# Patient Record
Sex: FEMALE | Race: WHITE
[De-identification: names, ages, dates, MRNs, and addresses within clinical notes are randomized per-mention and may not be internally consistent; named-entity substitution may affect disease eponyms.]

---

## 2017-09-20 ENCOUNTER — HOSPITAL ENCOUNTER (OUTPATIENT)
Dept: HOSPITAL 47 - RADMAMWWP | Age: 77
Discharge: HOME | End: 2017-09-20
Payer: MEDICARE

## 2017-09-20 DIAGNOSIS — Z12.31: Primary | ICD-10-CM

## 2017-09-20 PROCEDURE — 77063 BREAST TOMOSYNTHESIS BI: CPT

## 2017-09-21 NOTE — MM
Reason for exam: screening  (asymptomatic).

Last mammogram was performed 1 year and 4 months ago.



History:

Patient is postmenopausal.



Physical Findings:

A clinical breast exam by your physician is recommended on an annual basis and 

results should be correlated with mammographic findings.



MG 3D Screening Mammo W/Cad

Bilateral CC and MLO view(s) were taken.

Prior study comparison: May 18, 2016, bilateral MG screening mammo w CAD.  March 18, 2015, bilateral MG screening mammo w CAD.

There are scattered fibroglandular densities.  No significant changes when 

compared with prior studies.





ASSESSMENT: Negative, BI-RAD 1



RECOMMENDATION:

Routine screening mammogram of both breasts in 1 year.

## 2017-09-27 ENCOUNTER — HOSPITAL ENCOUNTER (OUTPATIENT)
Dept: HOSPITAL 47 - ORWHC2ENDO | Age: 77
Discharge: HOME | End: 2017-09-27
Payer: MEDICARE

## 2017-09-27 VITALS — DIASTOLIC BLOOD PRESSURE: 63 MMHG | SYSTOLIC BLOOD PRESSURE: 111 MMHG | HEART RATE: 83 BPM

## 2017-09-27 VITALS — BODY MASS INDEX: 29.9 KG/M2

## 2017-09-27 VITALS — RESPIRATION RATE: 18 BRPM

## 2017-09-27 VITALS — TEMPERATURE: 98.3 F

## 2017-09-27 DIAGNOSIS — K21.9: ICD-10-CM

## 2017-09-27 DIAGNOSIS — D12.5: ICD-10-CM

## 2017-09-27 DIAGNOSIS — E78.5: ICD-10-CM

## 2017-09-27 DIAGNOSIS — Z12.11: Primary | ICD-10-CM

## 2017-09-27 DIAGNOSIS — Z86.010: ICD-10-CM

## 2017-09-27 DIAGNOSIS — E11.9: ICD-10-CM

## 2017-09-27 DIAGNOSIS — K57.30: ICD-10-CM

## 2017-09-27 DIAGNOSIS — Z88.5: ICD-10-CM

## 2017-09-27 DIAGNOSIS — I10: ICD-10-CM

## 2017-09-27 DIAGNOSIS — Z85.828: ICD-10-CM

## 2017-09-27 DIAGNOSIS — Z79.899: ICD-10-CM

## 2017-09-27 DIAGNOSIS — D12.4: ICD-10-CM

## 2017-09-27 DIAGNOSIS — Z79.84: ICD-10-CM

## 2017-09-27 DIAGNOSIS — Z79.82: ICD-10-CM

## 2017-09-27 LAB — GLUCOSE BLD-MCNC: 96 MG/DL (ref 75–99)

## 2017-09-27 PROCEDURE — 88305 TISSUE EXAM BY PATHOLOGIST: CPT

## 2017-09-27 PROCEDURE — 45385 COLONOSCOPY W/LESION REMOVAL: CPT

## 2018-10-31 ENCOUNTER — HOSPITAL ENCOUNTER (OUTPATIENT)
Dept: HOSPITAL 47 - RADMAMWWP | Age: 78
Discharge: HOME | End: 2018-10-31
Payer: MEDICARE

## 2018-10-31 DIAGNOSIS — Z12.31: Primary | ICD-10-CM

## 2018-10-31 PROCEDURE — 77063 BREAST TOMOSYNTHESIS BI: CPT

## 2018-10-31 PROCEDURE — 77067 SCR MAMMO BI INCL CAD: CPT

## 2018-11-01 NOTE — MM
Reason for exam: screening  (asymptomatic).

Last mammogram was performed 1 year and 1 month ago.



History:

Patient is postmenopausal.



MG 3D Screening Mammo W/Cad

Bilateral CC and MLO view(s) were taken.

Prior study comparison: September 20, 2017, bilateral MG 3d screening mammo w/cad.

May 18, 2016, bilateral MG screening mammo w CAD.

There are scattered fibroglandular densities.  Chronic nodularity bilateral.  No 

significant changes when compared with prior studies.





ASSESSMENT: Benign, BI-RAD 2



RECOMMENDATION:

Routine screening mammogram of both breasts in 1 year.

## 2019-04-26 ENCOUNTER — HOSPITAL ENCOUNTER (OUTPATIENT)
Dept: HOSPITAL 47 - RADBDWWP | Age: 79
Discharge: HOME | End: 2019-04-26
Payer: MEDICARE

## 2019-04-26 DIAGNOSIS — M81.0: Primary | ICD-10-CM

## 2019-04-26 PROCEDURE — 77080 DXA BONE DENSITY AXIAL: CPT

## 2019-04-29 NOTE — BD
EXAMINATION TYPE: Axial Bone Density

 

DATE OF EXAM: 4/26/2019

 

COMPARISON: 1/25/2012

 

CLINICAL HISTORY: 79-year-old female disorder of bone

 

Height:  63.5

Weight:  177.3

 

FRAX RISK QUESTIONS:

Alcohol (3 or more units per day):  no

Family History (Parent hip fracture):  no

Glucocorticoids (More than 3mos):  no

           (Ex: prednisone, prednisolone, methylprednisolone, dexamethasone, and hydrocortisone).    
     

History of Fracture in Adulthood: yes

Secondary Osteoporosis:

  1.  Type 1 Diabetes: no

  2.  Hyperthyroidism: no

  3.  Menopause before 45: yes

  4.  Malnutrition: no

  5.  Chronic liver disease: no

Rheumatoid Arthritis: no

Current Tobacco Use: no

 

RISK FACTORS 

HISTORY OF: 

Family History of Osteoporosis: yes

Active: yes

Diet low in dairy products/other sources of calcium:  no

Postmenopausal woman: age 45

Lost more than 2 inches in height since high school: no

 

MEDICATIONS: metformin, simvastatin, lantis, acid reflux med

 

 

Additional History:

 

 

EXAM MEASUREMENTS: 

Bone mineral densitometry was performed using the [employer] System.

Bone mineral density as measured about the Lumbar spine is:  

----- L1-L4(G/cm2): 1.188

T Score Values are as follows:

----- L2: -0.6

----- L3: 0.6

----- L4: 0.7

----- L1-L4: 0.1

Bone mineral density has: increased 4.6 % since study of: 1.25.2012

 

Bone mineral density about the R hip (g/cm2): 0.676

Bone mineral density about the L hip (g/cm2): 0.646

T Score values are as follows:

-----R Neck: -2.6

-----L Neck: -2.8

-----R Total: -2.9

-----L Total: -2.9

Bone mineral density has: decreased -15.9 % since study of: 1.25.2012

 

 

IMPRESSION:

Osteoporosis (T Score less than -2.5).

 

There is increased fracture risk and therapy is usually indicated based on age.

 

Re-Screen 1-2 years.

 

 

 

 

 

NOTE:  T-SCORE=SD OF THE YOUNG ADULT MEAN.

## 2019-07-24 ENCOUNTER — HOSPITAL ENCOUNTER (OUTPATIENT)
Dept: HOSPITAL 47 - PROCWHC3 | Age: 79
Discharge: HOME | End: 2019-07-24
Payer: MEDICARE

## 2019-07-24 VITALS
SYSTOLIC BLOOD PRESSURE: 124 MMHG | DIASTOLIC BLOOD PRESSURE: 72 MMHG | RESPIRATION RATE: 16 BRPM | TEMPERATURE: 97.6 F | HEART RATE: 109 BPM

## 2019-07-24 DIAGNOSIS — M81.0: Primary | ICD-10-CM

## 2019-07-24 PROCEDURE — 96365 THER/PROPH/DIAG IV INF INIT: CPT

## 2019-12-20 ENCOUNTER — HOSPITAL ENCOUNTER (OUTPATIENT)
Dept: HOSPITAL 47 - RADMAMWWP | Age: 79
Discharge: HOME | End: 2019-12-20
Attending: FAMILY MEDICINE
Payer: MEDICARE

## 2019-12-20 DIAGNOSIS — Z12.31: Primary | ICD-10-CM

## 2019-12-20 PROCEDURE — 77063 BREAST TOMOSYNTHESIS BI: CPT

## 2019-12-20 PROCEDURE — 77067 SCR MAMMO BI INCL CAD: CPT

## 2019-12-23 NOTE — MM
Reason for exam: screening  (asymptomatic).

Last mammogram was performed 1 year and 2 months ago.



History:

Patient is postmenopausal and history of other cancer.



Physical Findings:

A clinical breast exam by your physician is recommended on an annual basis and 

results should be correlated with mammographic findings.



MG 3D Screening Mammo W/Cad

Bilateral CC and MLO view(s) were taken.

Prior study comparison: October 31, 2018, bilateral MG 3d screening mammo w/cad.  

September 20, 2017, bilateral MG 3d screening mammo w/cad.

There are scattered fibroglandular densities.  Stable benign calcifications. There

is no discrete abnormality.  No significant changes when compared with prior 

studies.





ASSESSMENT: Benign, BI-RAD 2



RECOMMENDATION:

Routine screening mammogram of both breasts in 1 year.

## 2021-03-05 ENCOUNTER — HOSPITAL ENCOUNTER (INPATIENT)
Dept: HOSPITAL 47 - EC | Age: 81
LOS: 8 days | DRG: 207 | End: 2021-03-13
Attending: INTERNAL MEDICINE | Admitting: INTERNAL MEDICINE
Payer: MEDICARE

## 2021-03-05 VITALS — BODY MASS INDEX: 37.8 KG/M2

## 2021-03-05 DIAGNOSIS — I50.32: ICD-10-CM

## 2021-03-05 DIAGNOSIS — E86.1: ICD-10-CM

## 2021-03-05 DIAGNOSIS — Z90.710: ICD-10-CM

## 2021-03-05 DIAGNOSIS — I48.0: ICD-10-CM

## 2021-03-05 DIAGNOSIS — E11.9: ICD-10-CM

## 2021-03-05 DIAGNOSIS — U07.1: Primary | ICD-10-CM

## 2021-03-05 DIAGNOSIS — G93.41: ICD-10-CM

## 2021-03-05 DIAGNOSIS — R06.82: ICD-10-CM

## 2021-03-05 DIAGNOSIS — K21.9: ICD-10-CM

## 2021-03-05 DIAGNOSIS — I95.9: ICD-10-CM

## 2021-03-05 DIAGNOSIS — D69.59: ICD-10-CM

## 2021-03-05 DIAGNOSIS — M81.0: ICD-10-CM

## 2021-03-05 DIAGNOSIS — J12.82: ICD-10-CM

## 2021-03-05 DIAGNOSIS — J80: ICD-10-CM

## 2021-03-05 DIAGNOSIS — I31.3: ICD-10-CM

## 2021-03-05 DIAGNOSIS — F41.9: ICD-10-CM

## 2021-03-05 DIAGNOSIS — I45.10: ICD-10-CM

## 2021-03-05 DIAGNOSIS — E87.2: ICD-10-CM

## 2021-03-05 DIAGNOSIS — Z79.82: ICD-10-CM

## 2021-03-05 DIAGNOSIS — I11.0: ICD-10-CM

## 2021-03-05 DIAGNOSIS — D72.810: ICD-10-CM

## 2021-03-05 DIAGNOSIS — Z79.4: ICD-10-CM

## 2021-03-05 DIAGNOSIS — E78.5: ICD-10-CM

## 2021-03-05 DIAGNOSIS — Z87.01: ICD-10-CM

## 2021-03-05 DIAGNOSIS — Z51.5: ICD-10-CM

## 2021-03-05 DIAGNOSIS — Z66: ICD-10-CM

## 2021-03-05 DIAGNOSIS — Z88.5: ICD-10-CM

## 2021-03-05 PROCEDURE — 86901 BLOOD TYPING SEROLOGIC RH(D): CPT

## 2021-03-05 PROCEDURE — 84443 ASSAY THYROID STIM HORMONE: CPT

## 2021-03-05 PROCEDURE — 83605 ASSAY OF LACTIC ACID: CPT

## 2021-03-05 PROCEDURE — 83690 ASSAY OF LIPASE: CPT

## 2021-03-05 PROCEDURE — 84132 ASSAY OF SERUM POTASSIUM: CPT

## 2021-03-05 PROCEDURE — 87205 SMEAR GRAM STAIN: CPT

## 2021-03-05 PROCEDURE — 36600 WITHDRAWAL OF ARTERIAL BLOOD: CPT

## 2021-03-05 PROCEDURE — 94640 AIRWAY INHALATION TREATMENT: CPT

## 2021-03-05 PROCEDURE — 83615 LACTATE (LD) (LDH) ENZYME: CPT

## 2021-03-05 PROCEDURE — 85025 COMPLETE CBC W/AUTO DIFF WBC: CPT

## 2021-03-05 PROCEDURE — 84100 ASSAY OF PHOSPHORUS: CPT

## 2021-03-05 PROCEDURE — 80053 COMPREHEN METABOLIC PANEL: CPT

## 2021-03-05 PROCEDURE — 71045 X-RAY EXAM CHEST 1 VIEW: CPT

## 2021-03-05 PROCEDURE — 87070 CULTURE OTHR SPECIMN AEROBIC: CPT

## 2021-03-05 PROCEDURE — 86850 RBC ANTIBODY SCREEN: CPT

## 2021-03-05 PROCEDURE — 36415 COLL VENOUS BLD VENIPUNCTURE: CPT

## 2021-03-05 PROCEDURE — 85379 FIBRIN DEGRADATION QUANT: CPT

## 2021-03-05 PROCEDURE — 87635 SARS-COV-2 COVID-19 AMP PRB: CPT

## 2021-03-05 PROCEDURE — 84145 PROCALCITONIN (PCT): CPT

## 2021-03-05 PROCEDURE — 83880 ASSAY OF NATRIURETIC PEPTIDE: CPT

## 2021-03-05 PROCEDURE — 93306 TTE W/DOPPLER COMPLETE: CPT

## 2021-03-05 PROCEDURE — 86900 BLOOD TYPING SEROLOGIC ABO: CPT

## 2021-03-05 PROCEDURE — 83036 HEMOGLOBIN GLYCOSYLATED A1C: CPT

## 2021-03-05 PROCEDURE — 83735 ASSAY OF MAGNESIUM: CPT

## 2021-03-05 PROCEDURE — 93005 ELECTROCARDIOGRAM TRACING: CPT

## 2021-03-05 PROCEDURE — 84484 ASSAY OF TROPONIN QUANT: CPT

## 2021-03-05 PROCEDURE — 94002 VENT MGMT INPAT INIT DAY: CPT

## 2021-03-05 PROCEDURE — 82805 BLOOD GASES W/O2 SATURATION: CPT

## 2021-03-05 PROCEDURE — 80048 BASIC METABOLIC PNL TOTAL CA: CPT

## 2021-03-05 PROCEDURE — 82550 ASSAY OF CK (CPK): CPT

## 2021-03-05 PROCEDURE — 85730 THROMBOPLASTIN TIME PARTIAL: CPT

## 2021-03-05 PROCEDURE — 85027 COMPLETE CBC AUTOMATED: CPT

## 2021-03-05 PROCEDURE — 99291 CRITICAL CARE FIRST HOUR: CPT

## 2021-03-05 PROCEDURE — 87040 BLOOD CULTURE FOR BACTERIA: CPT

## 2021-03-05 PROCEDURE — 94003 VENT MGMT INPAT SUBQ DAY: CPT

## 2021-03-05 PROCEDURE — 82728 ASSAY OF FERRITIN: CPT

## 2021-03-05 PROCEDURE — 85610 PROTHROMBIN TIME: CPT

## 2021-03-05 PROCEDURE — 86140 C-REACTIVE PROTEIN: CPT

## 2021-03-05 PROCEDURE — 81001 URINALYSIS AUTO W/SCOPE: CPT

## 2021-03-05 PROCEDURE — 96360 HYDRATION IV INFUSION INIT: CPT

## 2021-03-05 PROCEDURE — 96361 HYDRATE IV INFUSION ADD-ON: CPT

## 2021-03-05 NOTE — XR
EXAMINATION TYPE: XR chest 1V portable

 

DATE OF EXAM: 3/5/2021

 

COMPARISON: NONE

 

HISTORY: Short of breath

 

TECHNIQUE: Single view

 

FINDINGS: Heart is enlarged. There is moderate pulmonary airspace edema. Thoracic aorta is atheromato
us. There are chest leads.

 

IMPRESSION: Moderate pulmonary edema that could relate to acute congestive heart failure or RDS.

## 2021-03-05 NOTE — ED
SOB HPI





- General


Chief Complaint: Fall


Stated Complaint: Fall, Weakness


Time Seen by Provider: 03/05/21 22:53


Source: patient, family, RN notes reviewed, old records reviewed


Mode of arrival: wheelchair


Limitations: no limitations





- History of Present Illness


Initial Comments: 





This is a 81-year-old female to the ER for evaluation patient presents today for

evaluation regards to severe distress unresponsiveness found the ground.  

Patient's brought by EMS, we are unable to get a pulse ox on this patient at 

this time.  Patient is significantly low.  Patient brought here by EMS family is

at bedside providing recent history.  Patient denying any pain she is able to 

answer questions and is alert


MD Complaint: shortness of breath, cough, anxiety


-: days(s)


Severity: moderate


Severity scale (1-10): 4


Consistency: constant


Improves With: nothing


Worsens With: nothing


Known History Of: recurrent pneumonia


Context: recent URI, anxiety, recent illness


Associated Symptoms: fever, cough


Treatments Prior to Arrival: oxygen, bronchodilator





- Related Data


                                Home Medications











 Medication  Instructions  Recorded  Confirmed


 


Aspirin [Adult Low Dose Aspirin EC] 81 mg PO DAILY 09/26/17 03/05/21


 


Cholecalciferol [Vitamin D3] 1,000 unit PO DAILY 09/26/17 03/05/21


 


Dapagliflozin Propanediol [Farxiga] 10 mg PO HS 09/26/17 03/05/21


 


Insulin Glargine,Hum.rec.anlog 75 unit SQ  09/26/17 03/05/21





[Lantus Solostar]   


 


Losartan [Cozaar] 25 mg PO QAM 09/26/17 03/05/21


 


Omeprazole 20 mg PO DAILY 09/26/17 03/05/21


 


Simvastatin [Zocor] 10 mg PO HS 09/26/17 03/05/21


 


metFORMIN HCL 1,500 mg PO W/BRKFST 09/26/17 03/05/21


 


Acetaminophen Tab [Tylenol Tab] 500 mg PO Q6H PRN 03/05/21 03/05/21


 


Furosemide [Lasix] 40 mg PO DAILY PRN 03/05/21 03/05/21


 


Sulfamethox-Tmp 800-160Mg [Bactrim 1 tab PO Q12HR 03/05/21 03/05/21





-160 mg]   


 


metFORMIN HCL 1,000 mg PO W/SUPPER 03/05/21 03/05/21











                                    Allergies











Allergy/AdvReac Type Severity Reaction Status Date / Time


 


codeine AdvReac  "MADE ME Verified 03/05/21 23:44





   LOOPY"  














Review of Systems


ROS Statement: 


Those systems with pertinent positive or pertinent negative responses have been 

documented in the HPI.





ROS Other: All systems not noted in ROS Statement are negative.





Past Medical History


Past Medical History: Cancer, Diabetes Mellitus, GERD/Reflux, Hyperlipidemia, 

Hypertension


Additional Past Medical History / Comment(s): Osteoporosis


History of Any Multi-Drug Resistant Organisms: None Reported


Past Surgical History: Hysterectomy


Additional Past Surgical History / Comment(s): BOOGIE CATARACTS


Past Anesthesia/Blood Transfusion Reactions: No Reported Reaction


Past Psychological History: No Psychological Hx Reported


Smoking Status: Never smoker


Past Alcohol Use History: Rare


Past Drug Use History: None Reported





- Past Family History


  ** Mother


Family Medical History: Cancer





General Exam


Limitations: altered mental status, physical limitation


General appearance: alert, anxious


Head exam: Present: atraumatic, normocephalic, normal inspection


Eye exam: Present: normal appearance, PERRL, EOMI.  Absent: scleral icterus, 

conjunctival injection, periorbital swelling


ENT exam: Present: normal exam, mucous membranes moist


Neck exam: Present: normal inspection.  Absent: tenderness, meningismus, 

lymphadenopathy


Respiratory exam: Present: respiratory distress, wheezes, accessory muscle use, 

decreased breath sounds, prolonged expiratory.  Absent: rales, rhonchi, stridor


Cardiovascular Exam: Present: normal rhythm, tachycardia, normal heart sounds.  

Absent: systolic murmur, diastolic murmur, rubs, gallop, clicks


GI/Abdominal exam: Present: soft, normal bowel sounds.  Absent: distended, 

tenderness, guarding, rebound, rigid


Extremities exam: Present: normal inspection, full ROM, normal capillary refill.

 Absent: tenderness, pedal edema, joint swelling, calf tenderness


Back exam: Present: normal inspection


Neurological exam: Present: alert, oriented X3, CN II-XII intact


Psychiatric exam: Present: normal affect, normal mood


Skin exam: Present: warm, dry, intact, normal color.  Absent: rash





Course


                                   Vital Signs











  03/05/21 03/05/21 03/06/21





  23:03 23:40 00:21


 


Temperature 98.4 F  


 


Pulse Rate 133 H 122 H 116 H


 


Respiratory 30 H 34 H 35 H





Rate   


 


Blood Pressure 135/67 98/63 117/64


 


O2 Sat by Pulse 55 L 77 L 78 L





Oximetry   














  03/06/21 03/06/21





  01:28 03:34


 


Temperature  


 


Pulse Rate 111 H 95


 


Respiratory 33 H 25 H





Rate  


 


Blood Pressure 115/67 115/67


 


O2 Sat by Pulse 97 96





Oximetry  














- Reevaluation(s)


Reevaluation #1: 





Medical record is reviewed





Patient continuously evaluated, difficulty getting oxygen improved she is late 

on her left side and oxygen does going to the 90s, remaining on BiPAP





Patient remains alert





Patient denying any significant pain





Spoke with family at length regarding graveness of condition, the daughter who 

is at bedside is understanding, questions are answered





- Consultations


Consultation #1: 





spoke w Dr Burgess re Admission, they're agreeable for admission





Medical Decision Making





- Medical Decision Making





81 female  severe respiratory distress.  Patient found down with significantly

low pulse ox in the 50s 30s, patient brought to ER as well as supplemental O2 

oxygen improved patient is able to speak but is significantly short of breath 

will admit ICU for further evaluation management





- Lab Data


Result diagrams: 


                                 03/08/21 03:18





                                 03/08/21 03:18


                                   Lab Results











  03/05/21 03/05/21 03/05/21 Range/Units





  23:45 23:45 23:45 


 


WBC  3.8    (3.8-10.6)  k/uL


 


RBC  5.28    (3.80-5.40)  m/uL


 


Hgb  15.9    (11.4-16.0)  gm/dL


 


Hct  51.1 H    (34.0-46.0)  %


 


MCV  96.8    (80.0-100.0)  fL


 


MCH  30.1    (25.0-35.0)  pg


 


MCHC  31.1    (31.0-37.0)  g/dL


 


RDW  14.0    (11.5-15.5)  %


 


Plt Count  99 L    (150-450)  k/uL


 


MPV  10.0    


 


Neutrophils % (Manual)  56    %


 


Band Neuts % (Manual)  15    %


 


Lymphocytes % (Manual)  21    %


 


Monocytes % (Manual)  8    %


 


Neutrophils # (Manual)  2.60    (1.3-7.7)  k/uL


 


Lymphocytes # (Manual)  0.80 L    (1.0-4.8)  k/uL


 


Monocytes # (Manual)  0.30    (0-1.0)  k/uL


 


Nucleated RBCs  2 H    (0-0)  /100 WBC


 


Manual Slide Review  Performed    


 


Large Platelets  Present    


 


Hypochromasia  Slight    


 


PT   11.8   (9.0-12.0)  sec


 


INR   1.1   (<1.2)  


 


APTT   29.8   (22.0-30.0)  sec


 


D-Dimer   1.09 H   (<0.60)  mg/L FEU


 


Sample Site     


 


ABG pH     (7.35-7.45)  


 


ABG pCO2     (35-45)  mmHg


 


ABG pO2     ()  mmHg


 


ABG HCO3     (21-25)  mmol/L


 


ABG Total CO2     (19-24)  mmol/L


 


ABG O2 Saturation     (94-97)  %


 


ABG Base Excess     mmol/L


 


Heriberto Test     


 


FiO2     %


 


Sodium    133 L  (137-145)  mmol/L


 


Potassium    4.5  (3.5-5.1)  mmol/L


 


Chloride    100  ()  mmol/L


 


Carbon Dioxide    14 L  (22-30)  mmol/L


 


Anion Gap    19  mmol/L


 


BUN    17  (7-17)  mg/dL


 


Creatinine    0.90  (0.52-1.04)  mg/dL


 


Est GFR (CKD-EPI)AfAm    71  (>60 ml/min/1.73 sqM)  


 


Est GFR (CKD-EPI)NonAf    61  (>60 ml/min/1.73 sqM)  


 


Glucose    103 H  (74-99)  mg/dL


 


Lactic Ac Sepsis Rflx     


 


Plasma Lactic Acid Max     (0.7-2.0)  mmol/L


 


Calcium    8.5  (8.4-10.2)  mg/dL


 


Phosphorus    4.9 H  (2.5-4.5)  mg/dL


 


Magnesium    2.0  (1.6-2.3)  mg/dL


 


Total Bilirubin    0.9  (0.2-1.3)  mg/dL


 


AST    207 H  (14-36)  U/L


 


ALT    62 H  (4-34)  U/L


 


Alkaline Phosphatase    71  ()  U/L


 


Lactate Dehydrogenase    1724 H  (313-618)  U/L


 


Creatine Kinase    454 H  ()  U/L


 


Troponin I     (0.000-0.034)  ng/mL


 


C-Reactive Protein    77.0 H  (<10.0)  mg/L


 


NT-Pro-B Natriuret Pep     pg/mL


 


Total Protein    7.0  (6.3-8.2)  g/dL


 


Albumin    3.2 L  (3.5-5.0)  g/dL


 


TSH    0.714  (0.465-4.680)  mIU/L


 


Coronavirus (PCR)     (Not Detectd)  














  03/05/21 03/05/21 03/05/21 Range/Units





  23:45 23:45 23:45 


 


WBC     (3.8-10.6)  k/uL


 


RBC     (3.80-5.40)  m/uL


 


Hgb     (11.4-16.0)  gm/dL


 


Hct     (34.0-46.0)  %


 


MCV     (80.0-100.0)  fL


 


MCH     (25.0-35.0)  pg


 


MCHC     (31.0-37.0)  g/dL


 


RDW     (11.5-15.5)  %


 


Plt Count     (150-450)  k/uL


 


MPV     


 


Neutrophils % (Manual)     %


 


Band Neuts % (Manual)     %


 


Lymphocytes % (Manual)     %


 


Monocytes % (Manual)     %


 


Neutrophils # (Manual)     (1.3-7.7)  k/uL


 


Lymphocytes # (Manual)     (1.0-4.8)  k/uL


 


Monocytes # (Manual)     (0-1.0)  k/uL


 


Nucleated RBCs     (0-0)  /100 WBC


 


Manual Slide Review     


 


Large Platelets     


 


Hypochromasia     


 


PT     (9.0-12.0)  sec


 


INR     (<1.2)  


 


APTT     (22.0-30.0)  sec


 


D-Dimer     (<0.60)  mg/L FEU


 


Sample Site     


 


ABG pH     (7.35-7.45)  


 


ABG pCO2     (35-45)  mmHg


 


ABG pO2     ()  mmHg


 


ABG HCO3     (21-25)  mmol/L


 


ABG Total CO2     (19-24)  mmol/L


 


ABG O2 Saturation     (94-97)  %


 


ABG Base Excess     mmol/L


 


Heriberto Test     


 


FiO2     %


 


Sodium     (137-145)  mmol/L


 


Potassium     (3.5-5.1)  mmol/L


 


Chloride     ()  mmol/L


 


Carbon Dioxide     (22-30)  mmol/L


 


Anion Gap     mmol/L


 


BUN     (7-17)  mg/dL


 


Creatinine     (0.52-1.04)  mg/dL


 


Est GFR (CKD-EPI)AfAm     (>60 ml/min/1.73 sqM)  


 


Est GFR (CKD-EPI)NonAf     (>60 ml/min/1.73 sqM)  


 


Glucose     (74-99)  mg/dL


 


Lactic Ac Sepsis Rflx     


 


Plasma Lactic Acid Max  11.4 H*    (0.7-2.0)  mmol/L


 


Calcium     (8.4-10.2)  mg/dL


 


Phosphorus     (2.5-4.5)  mg/dL


 


Magnesium     (1.6-2.3)  mg/dL


 


Total Bilirubin     (0.2-1.3)  mg/dL


 


AST     (14-36)  U/L


 


ALT     (4-34)  U/L


 


Alkaline Phosphatase     ()  U/L


 


Lactate Dehydrogenase     (313-618)  U/L


 


Creatine Kinase     ()  U/L


 


Troponin I   0.118 H*   (0.000-0.034)  ng/mL


 


C-Reactive Protein     (<10.0)  mg/L


 


NT-Pro-B Natriuret Pep    330  pg/mL


 


Total Protein     (6.3-8.2)  g/dL


 


Albumin     (3.5-5.0)  g/dL


 


TSH     (0.465-4.680)  mIU/L


 


Coronavirus (PCR)     (Not Detectd)  














  03/06/21 03/06/21 03/06/21 Range/Units





  00:14 01:02 01:14 


 


WBC     (3.8-10.6)  k/uL


 


RBC     (3.80-5.40)  m/uL


 


Hgb     (11.4-16.0)  gm/dL


 


Hct     (34.0-46.0)  %


 


MCV     (80.0-100.0)  fL


 


MCH     (25.0-35.0)  pg


 


MCHC     (31.0-37.0)  g/dL


 


RDW     (11.5-15.5)  %


 


Plt Count     (150-450)  k/uL


 


MPV     


 


Neutrophils % (Manual)     %


 


Band Neuts % (Manual)     %


 


Lymphocytes % (Manual)     %


 


Monocytes % (Manual)     %


 


Neutrophils # (Manual)     (1.3-7.7)  k/uL


 


Lymphocytes # (Manual)     (1.0-4.8)  k/uL


 


Monocytes # (Manual)     (0-1.0)  k/uL


 


Nucleated RBCs     (0-0)  /100 WBC


 


Manual Slide Review     


 


Large Platelets     


 


Hypochromasia     


 


PT     (9.0-12.0)  sec


 


INR     (<1.2)  


 


APTT     (22.0-30.0)  sec


 


D-Dimer     (<0.60)  mg/L FEU


 


Sample Site   Right Radial   


 


ABG pH   7.32 L   (7.35-7.45)  


 


ABG pCO2   28 L   (35-45)  mmHg


 


ABG pO2   45 L*   ()  mmHg


 


ABG HCO3   14 L   (21-25)  mmol/L


 


ABG Total CO2   15 L   (19-24)  mmol/L


 


ABG O2 Saturation   76.6 L   (94-97)  %


 


ABG Base Excess   -12.0   mmol/L


 


Heriberto Test   Yes   


 


FiO2   100   %


 


Sodium     (137-145)  mmol/L


 


Potassium     (3.5-5.1)  mmol/L


 


Chloride     ()  mmol/L


 


Carbon Dioxide     (22-30)  mmol/L


 


Anion Gap     mmol/L


 


BUN     (7-17)  mg/dL


 


Creatinine     (0.52-1.04)  mg/dL


 


Est GFR (CKD-EPI)AfAm     (>60 ml/min/1.73 sqM)  


 


Est GFR (CKD-EPI)NonAf     (>60 ml/min/1.73 sqM)  


 


Glucose     (74-99)  mg/dL


 


Lactic Ac Sepsis Rflx    Y  


 


Plasma Lactic Acid Max     (0.7-2.0)  mmol/L


 


Calcium     (8.4-10.2)  mg/dL


 


Phosphorus     (2.5-4.5)  mg/dL


 


Magnesium     (1.6-2.3)  mg/dL


 


Total Bilirubin     (0.2-1.3)  mg/dL


 


AST     (14-36)  U/L


 


ALT     (4-34)  U/L


 


Alkaline Phosphatase     ()  U/L


 


Lactate Dehydrogenase     (313-618)  U/L


 


Creatine Kinase     ()  U/L


 


Troponin I     (0.000-0.034)  ng/mL


 


C-Reactive Protein     (<10.0)  mg/L


 


NT-Pro-B Natriuret Pep     pg/mL


 


Total Protein     (6.3-8.2)  g/dL


 


Albumin     (3.5-5.0)  g/dL


 


TSH     (0.465-4.680)  mIU/L


 


Coronavirus (PCR)  Detected A    (Not Detectd)  














- EKG Data


-: EKG Interpreted by Me (EKG is sinus tach 126   QTc 489)





- Radiology Data


Radiology results: report reviewed (Chest x-ray shows significant pulmonary 

edema medical traits likely coronavirus), image reviewed





Critical Care Time


Critical Care Time: Yes


Total Critical Care Time: 31





Disposition


Clinical Impression: 


 COVID-19, Pneumonia, ARDS (adult respiratory distress syndrome), Hypoxia





Disposition: ADMITTED AS IP TO THIS HOSP


Condition: Serious


Is patient prescribed a controlled substance at d/c from ED?: No

## 2021-03-06 LAB
ALBUMIN SERPL-MCNC: 3.2 G/DL (ref 3.5–5)
ALP SERPL-CCNC: 71 U/L (ref 38–126)
ALT SERPL-CCNC: 62 U/L (ref 4–34)
ANION GAP SERPL CALC-SCNC: 19 MMOL/L
APTT BLD: 29.8 SEC (ref 22–30)
AST SERPL-CCNC: 207 U/L (ref 14–36)
BUN SERPL-SCNC: 17 MG/DL (ref 7–17)
CALCIUM SPEC-MCNC: 8.5 MG/DL (ref 8.4–10.2)
CELLS COUNTED: 200
CHLORIDE SERPL-SCNC: 100 MMOL/L (ref 98–107)
CK SERPL-CCNC: 454 U/L (ref 30–135)
CO2 BLDA-SCNC: 15 MMOL/L (ref 19–24)
CO2 BLDA-SCNC: 17 MMOL/L (ref 19–24)
CO2 BLDA-SCNC: 19 MMOL/L (ref 19–24)
CO2 BLDA-SCNC: 20 MMOL/L (ref 19–24)
CO2 SERPL-SCNC: 14 MMOL/L (ref 22–30)
D DIMER PPP FEU-MCNC: 1.09 MG/L FEU (ref ?–0.6)
ERYTHROCYTE [DISTWIDTH] IN BLOOD BY AUTOMATED COUNT: 4.5 M/UL (ref 3.8–5.4)
ERYTHROCYTE [DISTWIDTH] IN BLOOD BY AUTOMATED COUNT: 5.28 M/UL (ref 3.8–5.4)
ERYTHROCYTE [DISTWIDTH] IN BLOOD: 13.6 % (ref 11.5–15.5)
ERYTHROCYTE [DISTWIDTH] IN BLOOD: 14 % (ref 11.5–15.5)
GLUCOSE BLD-MCNC: 118 MG/DL (ref 75–99)
GLUCOSE BLD-MCNC: 134 MG/DL (ref 75–99)
GLUCOSE BLD-MCNC: 142 MG/DL (ref 75–99)
GLUCOSE BLD-MCNC: 85 MG/DL (ref 75–99)
GLUCOSE SERPL-MCNC: 103 MG/DL (ref 74–99)
GLUCOSE UR QL: (no result)
HCO3 BLDA-SCNC: 14 MMOL/L (ref 21–25)
HCO3 BLDA-SCNC: 16 MMOL/L (ref 21–25)
HCO3 BLDA-SCNC: 18 MMOL/L (ref 21–25)
HCO3 BLDA-SCNC: 19 MMOL/L (ref 21–25)
HCT VFR BLD AUTO: 44.1 % (ref 34–46)
HCT VFR BLD AUTO: 51.1 % (ref 34–46)
HGB BLD-MCNC: 14 GM/DL (ref 11.4–16)
HGB BLD-MCNC: 15.9 GM/DL (ref 11.4–16)
HYALINE CASTS UR QL AUTO: 4 /LPF (ref 0–2)
INR PPP: 1.1 (ref ?–1.2)
LDH SPEC-CCNC: 1724 U/L (ref 313–618)
LYMPHOCYTES # BLD MANUAL: 0.8 K/UL (ref 1–4.8)
MAGNESIUM SPEC-SCNC: 2 MG/DL (ref 1.6–2.3)
MCH RBC QN AUTO: 30.1 PG (ref 25–35)
MCH RBC QN AUTO: 31.2 PG (ref 25–35)
MCHC RBC AUTO-ENTMCNC: 31.1 G/DL (ref 31–37)
MCHC RBC AUTO-ENTMCNC: 31.8 G/DL (ref 31–37)
MCV RBC AUTO: 96.8 FL (ref 80–100)
MCV RBC AUTO: 98.1 FL (ref 80–100)
MONOCYTES # BLD MANUAL: 0.3 K/UL (ref 0–1)
NEUTROPHILS NFR BLD MANUAL: 56 %
NEUTS SEG # BLD MANUAL: 2.6 K/UL (ref 1.3–7.7)
PCO2 BLDA: 28 MMHG (ref 35–45)
PCO2 BLDA: 37 MMHG (ref 35–45)
PCO2 BLDA: 39 MMHG (ref 35–45)
PCO2 BLDA: 49 MMHG (ref 35–45)
PH BLDA: 7.19 [PH] (ref 7.35–7.45)
PH BLDA: 7.22 [PH] (ref 7.35–7.45)
PH BLDA: 7.29 [PH] (ref 7.35–7.45)
PH BLDA: 7.32 [PH] (ref 7.35–7.45)
PH UR: 5.5 [PH] (ref 5–8)
PLATELET # BLD AUTO: 116 K/UL (ref 150–450)
PLATELET # BLD AUTO: 99 K/UL (ref 150–450)
PO2 BLDA: 45 MMHG (ref 83–108)
PO2 BLDA: 59 MMHG (ref 83–108)
PO2 BLDA: 74 MMHG (ref 83–108)
PO2 BLDA: 80 MMHG (ref 83–108)
POTASSIUM SERPL-SCNC: 4.5 MMOL/L (ref 3.5–5.1)
PROT SERPL-MCNC: 7 G/DL (ref 6.3–8.2)
PROT UR QL: (no result)
PT BLD: 11.8 SEC (ref 9–12)
RBC UR QL: 21 /HPF (ref 0–5)
SODIUM SERPL-SCNC: 133 MMOL/L (ref 137–145)
SP GR UR: 1.02 (ref 1–1.03)
SQUAMOUS UR QL AUTO: 1 /HPF (ref 0–4)
UROBILINOGEN UR QL STRIP: <2 MG/DL (ref ?–2)
WBC # BLD AUTO: 3.8 K/UL (ref 3.8–10.6)
WBC # BLD AUTO: 8.4 K/UL (ref 3.8–10.6)
WBC # UR AUTO: 1 /HPF (ref 0–5)

## 2021-03-06 PROCEDURE — 3E033XZ INTRODUCTION OF VASOPRESSOR INTO PERIPHERAL VEIN, PERCUTANEOUS APPROACH: ICD-10-PCS

## 2021-03-06 PROCEDURE — 0BH17EZ INSERTION OF ENDOTRACHEAL AIRWAY INTO TRACHEA, VIA NATURAL OR ARTIFICIAL OPENING: ICD-10-PCS

## 2021-03-06 PROCEDURE — 3E043XZ INTRODUCTION OF VASOPRESSOR INTO CENTRAL VEIN, PERCUTANEOUS APPROACH: ICD-10-PCS

## 2021-03-06 PROCEDURE — 03HY32Z INSERTION OF MONITORING DEVICE INTO UPPER ARTERY, PERCUTANEOUS APPROACH: ICD-10-PCS

## 2021-03-06 PROCEDURE — 4A133J1 MONITORING OF ARTERIAL PULSE, PERIPHERAL, PERCUTANEOUS APPROACH: ICD-10-PCS

## 2021-03-06 PROCEDURE — XW13325 TRANSFUSION OF CONVALESCENT PLASMA (NONAUTOLOGOUS) INTO PERIPHERAL VEIN, PERCUTANEOUS APPROACH, NEW TECHNOLOGY GROUP 5: ICD-10-PCS

## 2021-03-06 PROCEDURE — 05HN33Z INSERTION OF INFUSION DEVICE INTO LEFT INTERNAL JUGULAR VEIN, PERCUTANEOUS APPROACH: ICD-10-PCS

## 2021-03-06 PROCEDURE — 5A09357 ASSISTANCE WITH RESPIRATORY VENTILATION, LESS THAN 24 CONSECUTIVE HOURS, CONTINUOUS POSITIVE AIRWAY PRESSURE: ICD-10-PCS

## 2021-03-06 PROCEDURE — 4A133B1 MONITORING OF ARTERIAL PRESSURE, PERIPHERAL, PERCUTANEOUS APPROACH: ICD-10-PCS

## 2021-03-06 RX ADMIN — INSULIN ASPART SCH: 100 INJECTION, SOLUTION INTRAVENOUS; SUBCUTANEOUS at 12:20

## 2021-03-06 RX ADMIN — DEXTROSE SCH MLS/HR: 50 INJECTION, SOLUTION INTRAVENOUS at 06:00

## 2021-03-06 RX ADMIN — CEFAZOLIN SCH MLS/HR: 330 INJECTION, POWDER, FOR SOLUTION INTRAMUSCULAR; INTRAVENOUS at 16:44

## 2021-03-06 RX ADMIN — ALBUTEROL SULFATE SCH PUFF: 90 AEROSOL, METERED RESPIRATORY (INHALATION) at 14:39

## 2021-03-06 RX ADMIN — CEFAZOLIN SCH MLS/HR: 330 INJECTION, POWDER, FOR SOLUTION INTRAMUSCULAR; INTRAVENOUS at 22:41

## 2021-03-06 RX ADMIN — ALBUTEROL SULFATE SCH PUFF: 90 AEROSOL, METERED RESPIRATORY (INHALATION) at 08:27

## 2021-03-06 RX ADMIN — INSULIN ASPART SCH UNIT: 100 INJECTION, SOLUTION INTRAVENOUS; SUBCUTANEOUS at 19:05

## 2021-03-06 RX ADMIN — NOREPINEPHRINE BITARTRATE SCH MLS/HR: 1 INJECTION, SOLUTION, CONCENTRATE INTRAVENOUS at 22:38

## 2021-03-06 RX ADMIN — MORPHINE SULFATE PRN MG: 4 INJECTION, SOLUTION INTRAMUSCULAR; INTRAVENOUS at 06:33

## 2021-03-06 RX ADMIN — NOREPINEPHRINE BITARTRATE SCH MLS/HR: 1 INJECTION, SOLUTION, CONCENTRATE INTRAVENOUS at 10:41

## 2021-03-06 RX ADMIN — ALBUTEROL SULFATE SCH PUFF: 90 AEROSOL, METERED RESPIRATORY (INHALATION) at 11:10

## 2021-03-06 RX ADMIN — NOREPINEPHRINE BITARTRATE SCH MLS/HR: 1 INJECTION, SOLUTION, CONCENTRATE INTRAVENOUS at 14:16

## 2021-03-06 RX ADMIN — ALBUTEROL SULFATE SCH PUFF: 90 AEROSOL, METERED RESPIRATORY (INHALATION) at 19:16

## 2021-03-06 RX ADMIN — CHLORHEXIDINE GLUCONATE SCH ML: 1.2 RINSE ORAL at 20:26

## 2021-03-06 RX ADMIN — NOREPINEPHRINE BITARTRATE SCH MLS/HR: 1 INJECTION, SOLUTION, CONCENTRATE INTRAVENOUS at 16:43

## 2021-03-06 RX ADMIN — CEFAZOLIN SCH MLS/HR: 330 INJECTION, POWDER, FOR SOLUTION INTRAMUSCULAR; INTRAVENOUS at 09:00

## 2021-03-06 RX ADMIN — DEXTROSE SCH MLS/HR: 50 INJECTION, SOLUTION INTRAVENOUS at 09:01

## 2021-03-06 RX ADMIN — ENOXAPARIN SODIUM SCH MG: 40 INJECTION SUBCUTANEOUS at 09:01

## 2021-03-06 RX ADMIN — INSULIN ASPART SCH UNIT: 100 INJECTION, SOLUTION INTRAVENOUS; SUBCUTANEOUS at 23:46

## 2021-03-06 NOTE — XR
EXAMINATION TYPE: XR chest 1V portable

 

DATE OF EXAM: 3/6/2021

 

COMPARISON: Chest x-ray 3/6/2021

 

HISTORY: Tube placement

 

TECHNIQUE: Single frontal view of the chest is obtained.

 

FINDINGS:  Endotracheal tube and NG tube have been placed in the interval and are overlying appropria
te positions, there is a left jugular central venous catheter with the distal tip over the cavoatrial
 junction level. Bilateral airspace disease persists. There is no evident pneumothorax. Heart is like
ly stable, patient is rotated. There are overlying leads. Aorta is dense. Heart is obscured.

 

IMPRESSION:  Correlate for congestive heart failure, pneumonia

## 2021-03-06 NOTE — P.CNPUL
History of Present Illness


Consult date: 03/06/21


Reason for consult: dyspnea, pneumonia


History of present illness: 





this 81-year-old female patient, known history of diabetes and hypertension, was

brought into the emergency department upon the request of her family because of 

generalized weakness, falls and confusion x6 days.  Her condition declined and 

the patient decompensated over the past 48-72 hours.  This was rather a quick 

deterioration in her condition.  She had been having also shortness of breath 

for the past 2 days..  In the emergency department, the initial vitals showed 

that the patient was profoundly hypoxic with a pulse ox of 50%.  At that point, 

the patient also had blood work that showed severe lactic acidosis with a lactic

acid level of 11.  She wasgiven a chest x-ray that showed diffuse bilateral 

pulmonary infiltrates.  The patient was given a bolus of 1 L of IV fluid and 

following that she was moved to the intensive care unit.  The patient was kept 

on BiPAP throughout the night and currently she is on a BiPAP pressure of 14/7 

cm of water with an FiO2 of 100%.  Her FiO2 was 85%.  She is responsive, however

she is quite tachypneic and she is breathing in the mid 30s and she is 

struggling with her breathing and earlier this morning she started getting more 

restless and agitated.  We had to start on Precedex which is currently running 

at 0.4 mcg/kg/h.  She seems to be much more comfortable while on Precedex.had a 

bout of atrial fibrillation with rapid ventricular response in the ICU.  The 

heart rate went up to 170 range.  She was given 20 mg of Cardizem IV push.  She 

converted back to normal sinus rhythm.  She became hypotensive and her systolic 

blood pressure in the mid 60s.  She was given additional 2 L of IV fluids and 

the patient is currently on normal saline running at 30 mL an hour.  She will be

also started on norepinephrine infusion.  Lactic acid level is down to 6 based 

on the follow-up blood work.the blood gas showed a pH of 7.29 with a pCO2 of 37 

and pO2 of 80.  This was done a bipolar or 14/7 with an FiO2 of 100%.  Chest x-

ray showing diffuse breath and pulmonary infiltrates.  D-dimer is at 1.09.  The 

patient has a LDH of 1724, CRP level is 77, troponin is at 0.118, the proBNP 

level is 330, the patient has a lactic acid level of 11.4 at time of admission 

her lactic acid level is down to 6.7 and she has an anion gap metabolic acidosis

with a serum bicarb of 14 and a gap of 19.  UA is positive for glucose, +4, and 

COVID19 testing was positive.  As such, her presentation is consistent with 

COVID 19 pneumonia.





Review of Systems


ROS unobtainable: due to mental status





Past Medical History


Past Medical History: Cancer, Diabetes Mellitus, GERD/Reflux, Hyperlipidemia, 

Hypertension


Additional Past Medical History / Comment(s): Osteoporosis


History of Any Multi-Drug Resistant Organisms: None Reported


Past Surgical History: Hysterectomy


Additional Past Surgical History / Comment(s): BOOGIE CATARACTS


Past Anesthesia/Blood Transfusion Reactions: No Reported Reaction


Past Psychological History: No Psychological Hx Reported


Smoking Status: Never smoker


Past Alcohol Use History: Rare


Past Drug Use History: None Reported





- Past Family History


  ** Mother


Family Medical History: Cancer





Medications and Allergies


                                Home Medications











 Medication  Instructions  Recorded  Confirmed  Type


 


Aspirin [Adult Low Dose Aspirin EC] 81 mg PO DAILY 09/26/17 03/05/21 History


 


Cholecalciferol [Vitamin D3] 1,000 unit PO DAILY 09/26/17 03/05/21 History


 


Dapagliflozin Propanediol [Farxiga] 10 mg PO HS 09/26/17 03/05/21 History


 


Insulin Glargine,Hum.rec.anlog 75 unit SQ HS 09/26/17 03/05/21 History





[Lantus Solostar]    


 


Losartan [Cozaar] 25 mg PO QAM 09/26/17 03/05/21 History


 


Omeprazole 20 mg PO DAILY 09/26/17 03/05/21 History


 


Simvastatin [Zocor] 10 mg PO HS 09/26/17 03/05/21 History


 


metFORMIN HCL 1,500 mg PO W/BRKFST 09/26/17 03/05/21 History


 


Acetaminophen Tab [Tylenol Tab] 500 mg PO Q6H PRN 03/05/21 03/05/21 History


 


Furosemide [Lasix] 40 mg PO DAILY PRN 03/05/21 03/05/21 History


 


Sulfamethox-Tmp 800-160Mg [Bactrim 1 tab PO Q12HR 03/05/21 03/05/21 History





-160 mg]    


 


metFORMIN HCL 1,000 mg PO W/SUPPER 03/05/21 03/05/21 History








                                    Allergies











Allergy/AdvReac Type Severity Reaction Status Date / Time


 


codeine AdvReac  "MADE ME Verified 03/05/21 23:44





   LOOPY"  














Physical Exam


Vitals: 


                                   Vital Signs











  Temp Pulse Resp BP Pulse Ox


 


 03/06/21 08:00  98.3 F  76  27 H  87/39  88 L


 


 03/06/21 07:00     165/78 


 


 03/06/21 06:00   135 H  28 H  138/86  81 L


 


 03/06/21 05:00   117 H  32 H  138/86  92 L


 


 03/06/21 04:00  98.2 F  110 H  23  146/63  95


 


 03/06/21 03:34   95  25 H  115/67  96


 


 03/06/21 01:28   111 H  33 H  115/67  97


 


 03/06/21 00:21   116 H  35 H  117/64  78 L


 


 03/05/21 23:40   122 H  34 H  98/63  77 L


 


 03/05/21 23:03  98.4 F  133 H  30 H  135/67  55 L








                                Intake and Output











 03/05/21 03/06/21 03/06/21





 22:59 06:59 14:59


 


Intake Total  1730 630


 


Output Total  650 100


 


Balance  1080 530


 


Intake:   


 


  IV  1730 630


 


    Dexamethasone Sod  100 





    Phosphate 20 mg In   





    Dextrose 5% in Water 50   





    ml @ 100 mls/hr IV DAILY   





    [facility] Rx#:[id number]   


 


    Sodium Chloride 0.9% 1,   130





    000 ml @ 130 mls/hr IV .   





    Q7H42M STA Rx#:[id number]   


 


    Sodium Chloride 0.9% 1,  1630 500





    000 ml @ 999 mls/hr IV .   





    Q1H1M STA Rx#:[id number]   


 


Output:   


 


  Urine  650 100


 


Other:   


 


  Voiding Method  Indwelling Catheter 


 


  Weight  76.4 kg 

















the patient is lethargic, tachypneic, using some accessory muscles of breathing 

while being on a BiPAP at a pressure of 14/7 cm of water with an FiO2 of 100%.  

She is arousable.  She is currently less agitated on Precedex.


Head exam was generally normal. There was no scleral icterus or corneal arcus. 

Mucous membranes were moist.


Neck was supple and without jugular venous distension, thyromegaly, or carotid 

bruits. Carotids were easily palpable bilaterally. There was no adenopathy.


Lungs sounds are diminished and the patient has crackles in the mid and lower 

lung fields bilaterally.  Breath sounds are equal and symmetrical.


sounds are regular, tachycardic,Cardiac exam revealed the PMI to be normally 

situated and sized. The rhythm was regular and no extrasystoles were noted 

during several minutes of auscultation. The first and second heart sounds were 

normal and physiologic splitting of the second heart sound was noted. There were

no murmurs, rubs, clicks, or gallops.


Abdominal exam revealed normal bowel sounds. The abdomen was soft, non-tender, 

and without masses, organomegaly, or appreciable enlargement of the abdominal 

aorta.


Examination of the extremities revealed easily palpable radial, femoral and 

pedal pulses. There was no cyanosis, clubbing or edema.


Examination of the skin revealed no evidence of significant rashes, suspicious 

appearing nevi or other concerning lesions.





Results





- Laboratory Findings


CBC and BMP: 


                                 03/05/21 23:45





                                 03/05/21 23:45


ABG











ABG pH  7.29  (7.35-7.45)  L  03/06/21  04:41    


 


ABG pCO2  37 mmHg (35-45)   03/06/21  04:41    


 


ABG pO2  80 mmHg ()  L  03/06/21  04:41    


 


ABG O2 Saturation  95.0 % (94-97)   03/06/21  04:41    





PT/INR, D-dimer











PT  11.8 sec (9.0-12.0)   03/05/21  23:45    


 


INR  1.1  (<1.2)   03/05/21  23:45    


 


D-Dimer  1.09 mg/L FEU (<0.60)  H  03/05/21  23:45    








Abnormal lab findings: 


                                  Abnormal Labs











  03/05/21 03/05/21 03/05/21





  23:45 23:45 23:45


 


Hct  51.1 H  


 


Plt Count  99 L  


 


Lymphocytes # (Manual)  0.80 L  


 


Nucleated RBCs  2 H  


 


D-Dimer   1.09 H 


 


ABG pH   


 


ABG pCO2   


 


ABG pO2   


 


ABG HCO3   


 


ABG Total CO2   


 


ABG O2 Saturation   


 


Sodium    133 L


 


Carbon Dioxide    14 L


 


Glucose    103 H


 


Plasma Lactic Acid Max   


 


Phosphorus    4.9 H


 


AST    207 H


 


ALT    62 H


 


Lactate Dehydrogenase    1724 H


 


Creatine Kinase    454 H


 


Troponin I   


 


C-Reactive Protein    77.0 H


 


Albumin    3.2 L


 


Urine Protein   


 


Urine Glucose (UA)   


 


Urine RBC   


 


Amorphous Sediment   


 


Urine Bacteria   


 


Hyaline Casts   


 


Urine Mucus   


 


Coronavirus (PCR)   














  03/05/21 03/05/21 03/06/21





  23:45 23:45 00:14


 


Hct   


 


Plt Count   


 


Lymphocytes # (Manual)   


 


Nucleated RBCs   


 


D-Dimer   


 


ABG pH   


 


ABG pCO2   


 


ABG pO2   


 


ABG HCO3   


 


ABG Total CO2   


 


ABG O2 Saturation   


 


Sodium   


 


Carbon Dioxide   


 


Glucose   


 


Plasma Lactic Acid Max  11.4 H*  


 


Phosphorus   


 


AST   


 


ALT   


 


Lactate Dehydrogenase   


 


Creatine Kinase   


 


Troponin I   0.118 H* 


 


C-Reactive Protein   


 


Albumin   


 


Urine Protein   


 


Urine Glucose (UA)   


 


Urine RBC   


 


Amorphous Sediment   


 


Urine Bacteria   


 


Hyaline Casts   


 


Urine Mucus   


 


Coronavirus (PCR)    Detected A














  03/06/21 03/06/21 03/06/21





  01:02 04:01 04:41


 


Hct   


 


Plt Count   


 


Lymphocytes # (Manual)   


 


Nucleated RBCs   


 


D-Dimer   


 


ABG pH  7.32 L   7.29 L


 


ABG pCO2  28 L  


 


ABG pO2  45 L*   80 L


 


ABG HCO3  14 L   18 L


 


ABG Total CO2  15 L  


 


ABG O2 Saturation  76.6 L  


 


Sodium   


 


Carbon Dioxide   


 


Glucose   


 


Plasma Lactic Acid Max   6.7 H* 


 


Phosphorus   


 


AST   


 


ALT   


 


Lactate Dehydrogenase   


 


Creatine Kinase   


 


Troponin I   


 


C-Reactive Protein   


 


Albumin   


 


Urine Protein   


 


Urine Glucose (UA)   


 


Urine RBC   


 


Amorphous Sediment   


 


Urine Bacteria   


 


Hyaline Casts   


 


Urine Mucus   


 


Coronavirus (PCR)   














  03/06/21





  08:06


 


Hct 


 


Plt Count 


 


Lymphocytes # (Manual) 


 


Nucleated RBCs 


 


D-Dimer 


 


ABG pH 


 


ABG pCO2 


 


ABG pO2 


 


ABG HCO3 


 


ABG Total CO2 


 


ABG O2 Saturation 


 


Sodium 


 


Carbon Dioxide 


 


Glucose 


 


Plasma Lactic Acid Max 


 


Phosphorus 


 


AST 


 


ALT 


 


Lactate Dehydrogenase 


 


Creatine Kinase 


 


Troponin I 


 


C-Reactive Protein 


 


Albumin 


 


Urine Protein  1+ H


 


Urine Glucose (UA)  4+ H


 


Urine RBC  21 H


 


Amorphous Sediment  Occasional H


 


Urine Bacteria  Rare H


 


Hyaline Casts  4 H


 


Urine Mucus  Rare H


 


Coronavirus (PCR) 














- Diagnostic Findings


Chest x-ray: image reviewed





Assessment and Plan


Plan: 











1 acute hypoxic respiratory failure secondary to: 90 related pneumonia.  The 

patient is currently on BiPAP at a pressure of 14/7 with an FiO2 of 100%.  She 

remains quite hypoxic, not responding to BiPAP and she would obviously need 

intubation mechanical ventilation.  The patient had rapid decline in her overall

condition over the past 6 days, worse over the past 48 hours.  Chest x-ray 

showing diffuse bilaterally pulmonary infiltrates consistent with mobility 

related pneumonia





2 acute shortness of breath secondary to above





3 altered mental status secondary to above





4 acute lactic acidosis, improving and the lactic acid level is down to 6.7.  

The patient did have a anion gap metabolic acidosis at time of admission





5 lymphopenia along with a component of thrombocytopenia, viral in nature.





6 elevation in the inflammatory markings including LDH and CRP.  There is also 

mild elevation of the d-dimer





7 diabetes mellitus





8 hypertension





9 paroxysmal atrial fibrillation current rhythm is sinus after being given 

Cardizem bolus.  No drips were utilized.





10 hypotension, likely hypovolemic and the patient is receiving IV fluids and 

she'll be also started on norepinephrine for blood pressure control





Plan





I contacted her daughter.  I elected discussion with the daughter.  Apparently 

the patient's wishes were short-term intubation if needed for any major event 

like this.  Based on that, I made a decision to proceed with intubation 

mechanical ventilation.  Appropriate ventilator changes will be done.  The 

patient will obviously need a high PEEP low volume ventilation.  Review the 

blood.  This post intubation.  We'll insert a triple lumen catheter and arterial

line.  The patient is a candidate for steroids and I'm going to put on a high 

dose of Decadron 20 mg IV push.  The patient is a candidate for convalescent 

plasma and the patient will be given 2 units.  The patient is not a candidate 

for Remdesivir.  She may be a candidate for anti-interleukin-6, and this will be

initiated over the next 24 hours once infections have been ruled out.  We are 

going to do suspect that the patient with IV fluids.  She was given a total of 3

L.  Monitor lactic acid level.  Maintain maintenance fluid at the rate of 150 mL

an hour and use pressors if needed in the form of norepinephrine.  Check pro 

calcitonin level.  Check blood cultures.  Monitor hematologic profile.  D-dimer 

is mildly elevated and will put the patient on Lovenox 40 mg subcutaneously.  

Continue supportive care.  Provide vitamin C, vitamin D, and IV Pepcid.  Monitor

the cardiac rhythm.  Obtain echocardiogram.  We'll continue to follow.


Time with Patient: Greater than 30

## 2021-03-06 NOTE — ECHOF
Referral Reason:elevated troponin CoVID



MEASUREMENTS

--------

HEIGHT: 165.1 cm

WEIGHT: 81.6 kg

BP: 

RVIDd:   2.5 cm     (< 3.3)

IVSd:   1.0 cm     (0.6 - 1.1)

LVIDd:   5.1 cm     (3.9 - 5.3)

LVPWd:   1.2 cm     (0.6 - 1.1)

IVSs:   1.9 cm

LVIDs:   3.4 cm

LVPWs:   1.6 cm

LAESV Index (A-L):   36.60 ml/m

Ao Diam:   3.1 cm     (2.0 - 3.7)

AV Cusp:   2.2 cm     (1.5 - 2.6)

LA Diam:   3.7 cm     (2.7 - 3.8)

MV EXCURSION:   12.842 mm     (> 18.000)

MV EF SLOPE:   61 mm/s     (70 - 150)

EPSS:   0.9 cm

MV E Willard:   1.11 m/s

MV DecT:   184 ms

MV A Willard:   0.87 m/s

MV E/A Ratio:   1.27 

RAP:   20.00 mmHg

RVSP:   50.06 mmHg







FINDINGS

--------

This was a technically good study.

The left ventricular size is normal.   There is mild concentric left ventricular hypertrophy.   Overa
ll left ventricular systolic function is normal with, an EF between 55 - 60 %.   Increased LAP. Grade
 2 Diastolic Dysfuntion.

The right ventricle is normal in size.

LA is moderately dilated 34-39 ml/m2

The right atrial size is normal.

Aortic valve is trileaflet and is mildly thickened.

The mitral valve is normal.   The mitral valve leaflets are mildly thickened.   Mild mitral regurgita
tion is present.

The tricuspid valve appears structurally normal.   Mild tricuspid regurgitation present.   There is m
oderate pulmonary hypertension.   The right ventricular systolic pressure, as measured by Doppler, is
 50.06mmHg.

There is no pulmonic regurgitation present.

The aortic root size is normal.

The inferior vena cava is dilated with no significant inspiratory collapse which is consistent estima
peter right atrial pressure of >20 mmHg.

There is a small, generalized pericardial effusion present.



CONCLUSIONS

--------

1. The left ventricular size is normal.

2. There is mild concentric left ventricular hypertrophy.

3. Overall left ventricular systolic function is normal with, an EF between 55 - 60 %.

4. Increased LAP. Grade 2 Diastolic Dysfuntion.

5. LA is moderately dilated 34-39 ml/m2

6. Aortic valve is trileaflet and is mildly thickened.

7. The mitral valve leaflets are mildly thickened.

8. Mild mitral regurgitation is present.

9. Mild tricuspid regurgitation present.

10. There is moderate pulmonary hypertension.

11. The right ventricular systolic pressure, as measured by Doppler, is 50.06mmHg.

12. The inferior vena cava is dilated with no significant inspiratory collapse which is consistent es
timated right atrial pressure of >20 mmHg.

13. There is a small, generalized pericardial effusion present.





SONOGRAPHER: Porsche Sanford RDCS

## 2021-03-06 NOTE — P.HPIM
History of Present Illness


H&P Date: 03/06/21





Treasure Wheeler, is a 81-year-old female patient of Dr. Falcon, who 

presented to [facility] emergency room with a chief complaint 

of worsening shortness of breath generalized weakness and confusion, symptoms 

started about 6 days prior to admission but her condition declined significantly

in the last 2 days.  Patient was evaluated in emergency room vital examination 

on presentation revealed a temperature of 98.4 pulse 133 respiration 30 blood 

pressure 135/67 pulse ox 55% on room air her white blood count was 3.8 

hemoglobin 15.9 platelet count 99 sodium 133 potassium 4.5 plasma lactic acid 

11.4 BUN 17 creatinine 0.9 arterial blood gases revealed a pH of 7.19 pCO2 49 by

mouth to 59 .  Chest x-ray done in the emergency room revealed moderate 

pulmonary edema that could relate to congestive heart failure or ARDS , EKG 

revealed sinus tachycardia with right bundle branch block , coronavirus PCR was 

positive.


patient was admitted to intensive care unit she was started on BiPAP.  Patient 

developed atrial fibrillation with rapid ventricular response with a heart rate 

of 170 she was started on Cardizem drip, she had episodes of hypotension, she 

was started on levophed drip, and earlier this morning she required intubation 

and mechanical ventilation.


Her past medical history is significant for history of hypertension, history of 

hyperlipidemia, history of insulin-dependent diabetes mellitus, and history of 

osteoporosis





Past Medical History


Past Medical History: Cancer, Diabetes Mellitus, GERD/Reflux, Hyperlipidemia, 

Hypertension


Additional Past Medical History / Comment(s): Osteoporosis


History of Any Multi-Drug Resistant Organisms: None Reported


Past Surgical History: Hysterectomy


Additional Past Surgical History / Comment(s): BOOGIE CATARACTS


Past Anesthesia/Blood Transfusion Reactions: No Reported Reaction


Past Psychological History: No Psychological Hx Reported


Smoking Status: Never smoker


Past Alcohol Use History: Rare


Past Drug Use History: None Reported





- Past Family History


  ** Mother


Family Medical History: Cancer





Medications and Allergies


                                Home Medications











 Medication  Instructions  Recorded  Confirmed  Type


 


Aspirin [Adult Low Dose Aspirin EC] 81 mg PO DAILY 09/26/17 03/05/21 History


 


Cholecalciferol [Vitamin D3] 1,000 unit PO DAILY 09/26/17 03/05/21 History


 


Dapagliflozin Propanediol [Farxiga] 10 mg PO HS 09/26/17 03/05/21 History


 


Insulin Glargine,Hum.rec.anlog 75 unit SQ HS 09/26/17 03/05/21 History





[Lantus Solostar]    


 


Losartan [Cozaar] 25 mg PO QAM 09/26/17 03/05/21 History


 


Omeprazole 20 mg PO DAILY 09/26/17 03/05/21 History


 


Simvastatin [Zocor] 10 mg PO HS 09/26/17 03/05/21 History


 


metFORMIN HCL 1,500 mg PO W/BRKFST 09/26/17 03/05/21 History


 


Acetaminophen Tab [Tylenol Tab] 500 mg PO Q6H PRN 03/05/21 03/05/21 History


 


Furosemide [Lasix] 40 mg PO DAILY PRN 03/05/21 03/05/21 History


 


Sulfamethox-Tmp 800-160Mg [Bactrim 1 tab PO Q12HR 03/05/21 03/05/21 History





-160 mg]    


 


metFORMIN HCL 1,000 mg PO W/SUPPER 03/05/21 03/05/21 History








                                    Allergies











Allergy/AdvReac Type Severity Reaction Status Date / Time


 


codeine AdvReac  "MADE ME Verified 03/05/21 23:44





   LOOPY"  














Physical Exam


Vitals: 


                                   Vital Signs











  Temp Pulse Resp BP Pulse Ox


 


 03/06/21 06:00   135 H  28 H  138/86  81 L


 


 03/06/21 05:00   117 H  32 H  138/86  92 L


 


 03/06/21 04:00  98.2 F  110 H  23  146/63  95


 


 03/06/21 03:34   95  25 H  115/67  96


 


 03/06/21 01:28   111 H  33 H  115/67  97


 


 03/06/21 00:21   116 H  35 H  117/64  78 L


 


 03/05/21 23:40   122 H  34 H  98/63  77 L


 


 03/05/21 23:03  98.4 F  133 H  30 H  135/67  55 L








                                Intake and Output











 03/05/21 03/05/21 03/06/21





 14:59 22:59 06:59


 


Intake Total   2230


 


Output Total   650


 


Balance   1580


 


Intake:   


 


  IV   2230


 


    Dexamethasone Sod   100





    Phosphate 20 mg In   





    Dextrose 5% in Water 50   





    ml @ 100 mls/hr IV DAILY   





    ENRIQUE Rx#:[id number]   


 


    Sodium Chloride 0.9% 1,   2130





    000 ml @ 999 mls/hr IV .   





    Q1H1M STA Rx#:[id number]   


 


Output:   


 


  Urine   650


 


Other:   


 


  Voiding Method   Indwelling Catheter


 


  Weight   76.4 kg














Patient is intubated sedated maintained on mechanical ventilation


HEENT head normocephalic and atraumatic


Neck is supple no JVD no goiter no lymphadenopathy


Chest exam reveals a few scattered rhonchi no wheezing


Cardiac exam reveals regular heart sounds no gallops no murmurs


Abdomen is soft nontender no organomegaly with normal bowel sounds


Extremity exam reveals no edema no cyanosis or clubbing





Results


CBC & Chem 7: 


                                 03/06/21 10:18





                                 03/05/21 23:45


Labs: 


                  Abnormal Lab Results - Last 24 Hours (Table)











  03/05/21 03/05/21 03/05/21 Range/Units





  23:45 23:45 23:45 


 


Hct  51.1 H    (34.0-46.0)  %


 


Plt Count  99 L    (150-450)  k/uL


 


Lymphocytes # (Manual)  0.80 L    (1.0-4.8)  k/uL


 


Nucleated RBCs  2 H    (0-0)  /100 WBC


 


D-Dimer   1.09 H   (<0.60)  mg/L FEU


 


ABG pH     (7.35-7.45)  


 


ABG pCO2     (35-45)  mmHg


 


ABG pO2     ()  mmHg


 


ABG HCO3     (21-25)  mmol/L


 


ABG Total CO2     (19-24)  mmol/L


 


ABG O2 Saturation     (94-97)  %


 


Sodium    133 L  (137-145)  mmol/L


 


Carbon Dioxide    14 L  (22-30)  mmol/L


 


Glucose    103 H  (74-99)  mg/dL


 


Plasma Lactic Acid Alma Rosa     (0.7-2.0)  mmol/L


 


Phosphorus    4.9 H  (2.5-4.5)  mg/dL


 


AST    207 H  (14-36)  U/L


 


ALT    62 H  (4-34)  U/L


 


Lactate Dehydrogenase    1724 H  (313-618)  U/L


 


Creatine Kinase    454 H  ()  U/L


 


Troponin I     (0.000-0.034)  ng/mL


 


C-Reactive Protein    77.0 H  (<10.0)  mg/L


 


Albumin    3.2 L  (3.5-5.0)  g/dL


 


Coronavirus (PCR)     (Not Detectd)  














  03/05/21 03/05/21 03/06/21 Range/Units





  23:45 23:45 00:14 


 


Hct     (34.0-46.0)  %


 


Plt Count     (150-450)  k/uL


 


Lymphocytes # (Manual)     (1.0-4.8)  k/uL


 


Nucleated RBCs     (0-0)  /100 WBC


 


D-Dimer     (<0.60)  mg/L FEU


 


ABG pH     (7.35-7.45)  


 


ABG pCO2     (35-45)  mmHg


 


ABG pO2     ()  mmHg


 


ABG HCO3     (21-25)  mmol/L


 


ABG Total CO2     (19-24)  mmol/L


 


ABG O2 Saturation     (94-97)  %


 


Sodium     (137-145)  mmol/L


 


Carbon Dioxide     (22-30)  mmol/L


 


Glucose     (74-99)  mg/dL


 


Plasma Lactic Acid Alma Rosa  11.4 H*    (0.7-2.0)  mmol/L


 


Phosphorus     (2.5-4.5)  mg/dL


 


AST     (14-36)  U/L


 


ALT     (4-34)  U/L


 


Lactate Dehydrogenase     (313-618)  U/L


 


Creatine Kinase     ()  U/L


 


Troponin I   0.118 H*   (0.000-0.034)  ng/mL


 


C-Reactive Protein     (<10.0)  mg/L


 


Albumin     (3.5-5.0)  g/dL


 


Coronavirus (PCR)    Detected A  (Not Detectd)  














  03/06/21 03/06/21 03/06/21 Range/Units





  01:02 04:01 04:41 


 


Hct     (34.0-46.0)  %


 


Plt Count     (150-450)  k/uL


 


Lymphocytes # (Manual)     (1.0-4.8)  k/uL


 


Nucleated RBCs     (0-0)  /100 WBC


 


D-Dimer     (<0.60)  mg/L FEU


 


ABG pH  7.32 L   7.29 L  (7.35-7.45)  


 


ABG pCO2  28 L    (35-45)  mmHg


 


ABG pO2  45 L*   80 L  ()  mmHg


 


ABG HCO3  14 L   18 L  (21-25)  mmol/L


 


ABG Total CO2  15 L    (19-24)  mmol/L


 


ABG O2 Saturation  76.6 L    (94-97)  %


 


Sodium     (137-145)  mmol/L


 


Carbon Dioxide     (22-30)  mmol/L


 


Glucose     (74-99)  mg/dL


 


Plasma Lactic Acid Alma Rosa   6.7 H*   (0.7-2.0)  mmol/L


 


Phosphorus     (2.5-4.5)  mg/dL


 


AST     (14-36)  U/L


 


ALT     (4-34)  U/L


 


Lactate Dehydrogenase     (313-618)  U/L


 


Creatine Kinase     ()  U/L


 


Troponin I     (0.000-0.034)  ng/mL


 


C-Reactive Protein     (<10.0)  mg/L


 


Albumin     (3.5-5.0)  g/dL


 


Coronavirus (PCR)     (Not Detectd)  














Thrombosis Risk Factor Assmnt





- Choose All That Apply


Each Factor Represents 1 point: Obesity (BMI >25)


Each Risk Factor Represents 2 Points: Patient confined to bed


Each Risk Factor Represents 3 Points: Age 75 years or older


Thrombosis Risk Factor Assessment Total Risk Factor Score: 6


Thrombosis Risk Factor Assessment Level: High Risk





Assessment and Plan


Plan: 





1. Acute hypoxic respiratory failure, requiring intubation and mechanical alma rosa

tilation





2. Poitive Covid 19 testing





3. bilateral pulmonary infiltrates, likely related to pneumonia due to Covid 19 

virus





4.  Episode of atrial fibrillation with rapid ventricular response





5. Acute lactic acidosis improving





6. Episode of hypotension improved with IV fluid and levophed drip





7. Underlying history of hypertension





8. Underlying history of hyperlipidemia





9. Underlying history of diabetes mellitus





Currently patient is intubated sedated maintained on mechanical ventilation


She is maintained on IV Decadron and subcu Lovenox


Will follow closely prognosis is guarded due to age and severity of illness

## 2021-03-06 NOTE — P.PCN
Date of Procedure: 03/06/21


Preoperative Diagnosis: 


acute hypoxic respiratory failure


Postoperative Diagnosis: 


same


Procedure(s) Performed: 





insertion of a triple-lumen catheter, insertion of an arterial line


Anesthesia: local


Surgeon: Sara Minor


Pathology: other


Condition: critical


Disposition: ICU


Operative Findings: 











central line








Indication:  Hemodynamic monitoring/Intravenous access.





A time-out was completed verifying correct patient, procedure, site, 

positioning, and implant(s) or special equipment if applicable.





The patient was placed in a dependent position appropriate for central line 

placement based on the vein to be cannulated.  The patients  left shoulder  

neck    was prepped and draped in sterile fashion.  1% Lidocaine was used to 

anesthetize the surrounding skin area.  A triple lumen 9F Cordis catheter was 

introduced into the  left  internal jugular  vein using Seldinger technique.  

The catheter was threaded smoothly over the guide wire and appropriate blood 

return was obtained.  Each lumen of the catheter was evacuated of air and 

flushed with sterile saline.  The catheter was then sutured in place to the skin

and a sterile dressing applied.  Perfusion to the extremity distal to the point 

of catheter insertion was checked and found to be adequate.





The patient tolerated the procedure well and there were no complications.











Arterial line








Indication:  Hemodynamic monitoring.





A time-out was completed verifying correct patient, procedure, site, 

positioning, and implant(s) or special equipment if applicable.





Allens test was performed to ensure adequate perfusion.  The patients right 

wristwas prepped and draped in sterile fashion.  1% Lidocaine was used to 

anesthetize the area.  An 18G Arrow arterial line was introduced into the radial

artery.  The catheter was threaded over the guide wire and the needle was 

removed with appropriate pulsatile blood return.  Blood loss was minimal.  The 

catheter was then sutured in place to the skin and a sterile dressing applied.  

Perfusion to the extremity distal to the point of catheter insertion was checked

and found to be adequate.





The patient tolerated the procedure well and there were no complications.

## 2021-03-06 NOTE — XR
EXAMINATION TYPE: XR chest 1V portable

 

DATE OF EXAM: 3/6/2021

 

COMPARISON: Chest x-ray dated 3/5/2021

 

HISTORY: Shortness of breath

 

TECHNIQUE: Single frontal view of the chest is obtained.

 

FINDINGS:  Bilateral airspace disease, interstitial changes are again noted similar to prior exam. No
 evident pneumothorax. Heart is enlarged. Aorta is dense. Patient is rotated and there are overlying 
leads. Difficult to exclude effusion.

 

IMPRESSION:  Correlate for pulmonary edema, congestive heart failure, follow-up suggested.

## 2021-03-07 LAB
ALBUMIN SERPL-MCNC: 2.5 G/DL (ref 3.5–5)
ALP SERPL-CCNC: 81 U/L (ref 38–126)
ALT SERPL-CCNC: 46 U/L (ref 4–34)
ANION GAP SERPL CALC-SCNC: 9 MMOL/L
AST SERPL-CCNC: 166 U/L (ref 14–36)
BUN SERPL-SCNC: 28 MG/DL (ref 7–17)
CALCIUM SPEC-MCNC: 7 MG/DL (ref 8.4–10.2)
CELLS COUNTED: 100
CHLORIDE SERPL-SCNC: 110 MMOL/L (ref 98–107)
CO2 BLDA-SCNC: 18 MMOL/L (ref 19–24)
CO2 BLDA-SCNC: 19 MMOL/L (ref 19–24)
CO2 SERPL-SCNC: 17 MMOL/L (ref 22–30)
ERYTHROCYTE [DISTWIDTH] IN BLOOD BY AUTOMATED COUNT: 4.72 M/UL (ref 3.8–5.4)
ERYTHROCYTE [DISTWIDTH] IN BLOOD: 13.8 % (ref 11.5–15.5)
GLUCOSE BLD-MCNC: 157 MG/DL (ref 75–99)
GLUCOSE BLD-MCNC: 160 MG/DL (ref 75–99)
GLUCOSE BLD-MCNC: 165 MG/DL (ref 75–99)
GLUCOSE BLD-MCNC: 175 MG/DL (ref 75–99)
GLUCOSE BLD-MCNC: 224 MG/DL (ref 75–99)
GLUCOSE SERPL-MCNC: 184 MG/DL (ref 74–99)
HBA1C MFR BLD: 7.1 % (ref 4–6)
HCO3 BLDA-SCNC: 17 MMOL/L (ref 21–25)
HCO3 BLDA-SCNC: 18 MMOL/L (ref 21–25)
HCT VFR BLD AUTO: 47 % (ref 34–46)
HGB BLD-MCNC: 14.1 GM/DL (ref 11.4–16)
LIPASE SERPL-CCNC: 75 U/L (ref 23–300)
LYMPHOCYTES # BLD MANUAL: 2.21 K/UL (ref 1–4.8)
MAGNESIUM SPEC-SCNC: 2 MG/DL (ref 1.6–2.3)
MCH RBC QN AUTO: 29.9 PG (ref 25–35)
MCHC RBC AUTO-ENTMCNC: 30.1 G/DL (ref 31–37)
MCV RBC AUTO: 99.5 FL (ref 80–100)
MONOCYTES # BLD MANUAL: 0.74 K/UL (ref 0–1)
NEUTROPHILS NFR BLD MANUAL: 83 %
NEUTS SEG # BLD MANUAL: 21.6 K/UL (ref 1.3–7.7)
PCO2 BLDA: 43 MMHG (ref 35–45)
PCO2 BLDA: 46 MMHG (ref 35–45)
PH BLDA: 7.23 [PH] (ref 7.35–7.45)
PLATELET # BLD AUTO: 181 K/UL (ref 150–450)
PO2 BLDA: 108 MMHG (ref 83–108)
PO2 BLDA: 75 MMHG (ref 83–108)
POTASSIUM SERPL-SCNC: 5.6 MMOL/L (ref 3.5–5.1)
PROT SERPL-MCNC: 5.9 G/DL (ref 6.3–8.2)
SODIUM SERPL-SCNC: 136 MMOL/L (ref 137–145)
WBC # BLD AUTO: 24.6 K/UL (ref 3.8–10.6)

## 2021-03-07 RX ADMIN — CEFAZOLIN SCH MLS/HR: 330 INJECTION, POWDER, FOR SOLUTION INTRAMUSCULAR; INTRAVENOUS at 09:15

## 2021-03-07 RX ADMIN — CEFAZOLIN SCH MLS/HR: 330 INJECTION, POWDER, FOR SOLUTION INTRAMUSCULAR; INTRAVENOUS at 14:04

## 2021-03-07 RX ADMIN — NOREPINEPHRINE BITARTRATE SCH MLS/HR: 1 INJECTION, SOLUTION, CONCENTRATE INTRAVENOUS at 10:03

## 2021-03-07 RX ADMIN — NOREPINEPHRINE BITARTRATE SCH MLS/HR: 1 INJECTION, SOLUTION, CONCENTRATE INTRAVENOUS at 02:56

## 2021-03-07 RX ADMIN — LEVOFLOXACIN SCH MLS/HR: 750 INJECTION, SOLUTION INTRAVENOUS at 09:19

## 2021-03-07 RX ADMIN — INSULIN ASPART SCH UNIT: 100 INJECTION, SOLUTION INTRAVENOUS; SUBCUTANEOUS at 15:08

## 2021-03-07 RX ADMIN — INSULIN ASPART SCH UNIT: 100 INJECTION, SOLUTION INTRAVENOUS; SUBCUTANEOUS at 18:18

## 2021-03-07 RX ADMIN — MORPHINE SULFATE PRN MG: 4 INJECTION, SOLUTION INTRAMUSCULAR; INTRAVENOUS at 00:22

## 2021-03-07 RX ADMIN — ENOXAPARIN SODIUM SCH MG: 40 INJECTION SUBCUTANEOUS at 09:19

## 2021-03-07 RX ADMIN — NOREPINEPHRINE BITARTRATE SCH MLS/HR: 1 INJECTION, SOLUTION, CONCENTRATE INTRAVENOUS at 13:14

## 2021-03-07 RX ADMIN — ALBUTEROL SULFATE SCH PUFF: 90 AEROSOL, METERED RESPIRATORY (INHALATION) at 19:55

## 2021-03-07 RX ADMIN — INSULIN ASPART SCH UNIT: 100 INJECTION, SOLUTION INTRAVENOUS; SUBCUTANEOUS at 23:52

## 2021-03-07 RX ADMIN — CHLORHEXIDINE GLUCONATE SCH ML: 1.2 RINSE ORAL at 20:05

## 2021-03-07 RX ADMIN — PIPERACILLIN AND TAZOBACTAM SCH MLS/HR: 3; .375 INJECTION, POWDER, FOR SOLUTION INTRAVENOUS at 18:17

## 2021-03-07 RX ADMIN — INSULIN ASPART SCH UNIT: 100 INJECTION, SOLUTION INTRAVENOUS; SUBCUTANEOUS at 05:35

## 2021-03-07 RX ADMIN — ALBUTEROL SULFATE SCH PUFF: 90 AEROSOL, METERED RESPIRATORY (INHALATION) at 15:02

## 2021-03-07 RX ADMIN — DEXTROSE SCH MLS/HR: 50 INJECTION, SOLUTION INTRAVENOUS at 09:18

## 2021-03-07 RX ADMIN — CEFAZOLIN SCH MLS/HR: 330 INJECTION, POWDER, FOR SOLUTION INTRAMUSCULAR; INTRAVENOUS at 03:01

## 2021-03-07 RX ADMIN — CHLORHEXIDINE GLUCONATE SCH ML: 1.2 RINSE ORAL at 09:18

## 2021-03-07 RX ADMIN — PIPERACILLIN AND TAZOBACTAM SCH MLS/HR: 3; .375 INJECTION, POWDER, FOR SOLUTION INTRAVENOUS at 09:20

## 2021-03-07 RX ADMIN — ALBUTEROL SULFATE SCH PUFF: 90 AEROSOL, METERED RESPIRATORY (INHALATION) at 07:26

## 2021-03-07 RX ADMIN — ALBUTEROL SULFATE SCH PUFF: 90 AEROSOL, METERED RESPIRATORY (INHALATION) at 10:52

## 2021-03-07 NOTE — XR
EXAMINATION TYPE: XR chest 1V portable

 

DATE OF EXAM: 3/7/2021

 

COMPARISON: Chest x-ray 3/6/2021

 

HISTORY: Intubated

 

TECHNIQUE: Single frontal view of the chest is obtained.

 

FINDINGS:  Endotracheal tube, NG tube, left jugular central venous catheter are again noted and are o
verlying appropriate positions. Patient is rotated. No evident pneumothorax or pleural effusion. Ther
e is some interval improved aeration within the lungs, interval improved visualization of the hemidia
phragms. Aorta is dense. Cardiac mediastinal silhouette likely stable, differences in technique.

 

IMPRESSION:  There is some improvement in aeration.

## 2021-03-07 NOTE — P.PN
Subjective


Progress Note Date: 03/07/21








this 81-year-old female patient, known history of diabetes and hypertension, was

brought into the emergency department upon the request of her family because of 

generalized weakness, falls and confusion x6 days.  Her condition declined and 

the patient decompensated over the past 48-72 hours.  This was rather a quick 

deterioration in her condition.  She had been having also shortness of breath 

for the past 2 days..  In the emergency department, the initial vitals showed 

that the patient was profoundly hypoxic with a pulse ox of 50%.  At that point, 

the patient also had blood work that showed severe lactic acidosis with a lactic

acid level of 11.  She wasgiven a chest x-ray that showed diffuse bilateral 

pulmonary infiltrates.  The patient was given a bolus of 1 L of IV fluid and 

following that she was moved to the intensive care unit.  The patient was kept 

on BiPAP throughout the night and currently she is on a BiPAP pressure of 14/7 

cm of water with an FiO2 of 100%.  Her FiO2 was 85%.  She is responsive, however

she is quite tachypneic and she is breathing in the mid 30s and she is struggl

ing with her breathing and earlier this morning she started getting more 

restless and agitated.  We had to start on Precedex which is currently running 

at 0.4 mcg/kg/h.  She seems to be much more comfortable while on Precedex.had a 

bout of atrial fibrillation with rapid ventricular response in the ICU.  The 

heart rate went up to 170 range.  She was given 20 mg of Cardizem IV push.  She 

converted back to normal sinus rhythm.  She became hypotensive and her systolic 

blood pressure in the mid 60s.  She was given additional 2 L of IV fluids and 

the patient is currently on normal saline running at 30 mL an hour.  She will be

also started on norepinephrine infusion.  Lactic acid level is down to 6 based 

on the follow-up blood work.the blood gas showed a pH of 7.29 with a pCO2 of 37 

and pO2 of 80.  This was done a bipolar or 14/7 with an FiO2 of 100%.  Chest x-

ray showing diffuse breath and pulmonary infiltrates.  D-dimer is at 1.09.  The 

patient has a LDH of 1724, CRP level is 77, troponin is at 0.118, the proBNP 

level is 330, the patient has a lactic acid level of 11.4 at time of admission 

her lactic acid level is down to 6.7 and she has an anion gap metabolic acidosis

with a serum bicarb of 14 and a gap of 19.  UA is positive for glucose, +4, and 

COVID19 testing was positive.  As such, her presentation is consistent with 

COVID 19 pneumonia.








03/07/2021, the patient remains intubated on a mechanical ventilator.  The 

patient was intubated yesterday for an acute hypoxic respiratory failure and she

was placed on mechanical ventilator she is currently off of a running at 40 

mcg/kg per minute.  The patient is on no paralytics.  She is quite successful 

mechanical ventilator.  She is an assist-control mode at the rate of 30 with a 

tidal volume of 375 and FiO2 of 60% with a PEEP of this.  The chest x-ray from 

today is showing bilateral perihilar and lower lobe in addition to right upper 

lobe pulmonary rate.  ET tube is in a good location.  It is seen around 2 cm 

above the dasia.  The patient also has a left internal jugular.  Some today 

showed a pH of 7.18 with a pCO2 of 46 and pO2 of 108 and this wasn't an FiO2 of 

60%.  Meanwhile, the patient was quite hypotensive and acidotic and the patient 

had a very high lactic acid level of 11.4 which dropped down to 4.6 with fluid 

resuscitation.  Overall, the patient received a total of 3 L of IV fluid in the 

form of normal saline and the patient is currently running at 130s's an hour of 

normal saline.  Urine output is in order of 30-40 mL an hour and the net fluid 

balance is been +4.8 L over the past 24 hours.  As such the patient is well 

resuscitated.  Most recent blood pressure is 95/44 and the patient is currently 

on pressors at 19 mcg/ per minute of norepinephrine infusion.  The procalcitonin

level was at 0.53 and blood cultures and sputum cultures were sent and the 

patient was covered with empiric antibiotics and the patient was given cefepime 

and Levaquin.  White cell count today's at 24.  She was given Decadron.  She was

given convalescent plasma one unit..  The renal function is stable.  

Electrolytes are stable.  Potassium level is at 5.6 and this needs to be 

repeated.  Serum bicarb is at 17 which is improving.  Less lactic acid level 

from yesterday was at 4.6.  The patient's blood sugar is at 160 and she is on 

sliding scale coverage for blood sugar control.  Cardiac rhythm is sinus.  No 

further bouts of atrial fibrillation.  She did have one a 1 bouts yesterday 

prior to intubation and she converted back into normal sinus rhythm without any 

major difficulties.





Objective





- Vital Signs


Vital signs: 


                                   Vital Signs











Temp  99.1 F   03/07/21 08:00


 


Pulse  112 H  03/07/21 08:30


 


Resp  31 H  03/07/21 08:30


 


BP  111/62   03/07/21 08:30


 


Pulse Ox  95   03/07/21 08:30








                                 Intake & Output











 03/06/21 03/07/21 03/07/21





 18:59 06:59 18:59


 


Intake Total 3359.880 2782.820 351.771


 


Output Total 885 400 65


 


Balance 2474.880 2382.820 286.771


 


Weight  82.6 kg 


 


Intake:   


 


  IV 2810 1800 300


 


    0.9 Sodium Chloride 140 240 40


 


    Sodium Chloride 0.9% 1,   260





    000 ml @ 130 mls/hr IV .   





    Q7H42M ENRIQUE Rx#:[id number]   


 


    Sodium Chloride 0.9% 1, 1170 1560 





    000 ml @ 130 mls/hr IV .   





    Q7H42M STA Rx#:[id number]   


 


    Sodium Chloride 0.9% 1, 1500  





    000 ml @ 999 mls/hr IV .   





    Q1H1M STA Rx#:[id number]   


 


  Intake, IV Titration 357.880 982.820 51.771





  Amount   


 


    Dexmedetomidine/0.9% NaCl 12.733  





    (Pmx) 400 mcg In Empty   





    Bag 1 bag @ Titrate IV .   





    Q0M ENRIQUE Rx#:[id number]   


 


    Norepinephrine 4 mg In 254.000  





    Sodium Chloride 0.9% 250   





    ml @ 0.05 MCG/KG/MIN 14.   





    554 mls/hr IV .[id number]   





    ENRIQUE Rx#:[id number]   


 


    Norepinephrine 8 mg In 13.601 708.628 51.771





    Sodium Chloride 0.9% 250   





    ml @ 0.05 MCG/KG/MIN 7.   





    392 mls/hr IV .Q24H ENRIQUE   





    Rx#:[id number]   


 


    propofoL 1,000 mg In 77.546 274.192 





    Empty Bag 1 bag @ Titrate   





    IV .Q0M ENRIQUE Rx#:   





    [id number]   


 


  Blood Product 192  


 


    Ffp Pher Conval Covid19 192  





    Acda 2  Unit   





    [id number]   


 


Output:   


 


  Gastric Drainage 500  


 


  Urine 385 400 65


 


Other:   


 


  Voiding Method Indwelling Catheter Indwelling Catheter 








                       ABP, PAP, CO, CI - Last Documented











Arterial Blood Pressure        116/44

















- Exam








the patient is sedated on propofol, intubated on a mechanical ventilator.  The 

patient has a orogastric and orotracheal tube are both of them are in place.  

She has a left IJ triple-lumen catheter in place.


Head exam was generally normal. There was no scleral icterus or corneal arcus. 

Mucous membranes were moist.


Neck was supple and without jugular venous distension, thyromegaly, or carotid 

bruits. Carotids were easily palpable bilaterally. There was no adenopathy.


Lungs sounds are diminished and the patient has crackles in the mid and lower 

lung fields bilaterally.  Breath sounds are equal and symmetrical.


sounds are regular, tachycardic,Cardiac exam revealed the PMI to be normally 

situated and sized. The rhythm was regular and no extrasystoles were noted 

during several minutes of auscultation. The first and second heart sounds were 

normal and physiologic splitting of the second heart sound was noted. There were

no murmurs, rubs, clicks, or gallops.


Abdominal exam revealed normal bowel sounds. The abdomen was soft, non-tender, 

and without masses, organomegaly, or appreciable enlargement of the abdominal 

aorta.


Examination of the extremities revealed easily palpable radial, femoral and 

pedal pulses. There was no cyanosis, clubbing or edema.


Examination of the skin revealed no evidence of significant rashes, suspicious 

appearing nevi or other concerning lesions.


Neurologically, the patient is sedated and she is calm and comfortable, 

arousable, pupils are equal and reactive to light.





- Labs


CBC & Chem 7: 


                                 03/07/21 04:55





                                 03/07/21 04:55


Labs: 


                  Abnormal Lab Results - Last 24 Hours (Table)











  03/06/21 03/06/21 03/06/21 Range/Units





  04:01 08:10 09:50 


 


WBC     (3.8-10.6)  k/uL


 


Hct     (34.0-46.0)  %


 


MCHC     (31.0-37.0)  g/dL


 


Plt Count     (150-450)  k/uL


 


ABG pH    7.19 L*  (7.35-7.45)  


 


ABG pCO2    49 H  (35-45)  mmHg


 


ABG pO2    59 L*  ()  mmHg


 


ABG HCO3    19 L  (21-25)  mmol/L


 


ABG Total CO2     (19-24)  mmol/L


 


ABG O2 Saturation    85.0 L  (94-97)  %


 


Sodium     (137-145)  mmol/L


 


Potassium     (3.5-5.1)  mmol/L


 


Chloride     ()  mmol/L


 


Carbon Dioxide     (22-30)  mmol/L


 


BUN     (7-17)  mg/dL


 


Glucose     (74-99)  mg/dL


 


POC Glucose (mg/dL)     (75-99)  mg/dL


 


Plasma Lactic Acid Max   4.5 H*   (0.7-2.0)  mmol/L


 


Calcium     (8.4-10.2)  mg/dL


 


AST     (14-36)  U/L


 


ALT     (4-34)  U/L


 


Total Protein     (6.3-8.2)  g/dL


 


Albumin     (3.5-5.0)  g/dL


 


Procalcitonin  0.53 H    (0.02-0.09)  ng/mL














  03/06/21 03/06/21 03/06/21 Range/Units





  10:18 11:21 12:18 


 


WBC     (3.8-10.6)  k/uL


 


Hct     (34.0-46.0)  %


 


MCHC     (31.0-37.0)  g/dL


 


Plt Count  116 L    (150-450)  k/uL


 


ABG pH     (7.35-7.45)  


 


ABG pCO2     (35-45)  mmHg


 


ABG pO2     ()  mmHg


 


ABG HCO3     (21-25)  mmol/L


 


ABG Total CO2     (19-24)  mmol/L


 


ABG O2 Saturation     (94-97)  %


 


Sodium     (137-145)  mmol/L


 


Potassium     (3.5-5.1)  mmol/L


 


Chloride     ()  mmol/L


 


Carbon Dioxide     (22-30)  mmol/L


 


BUN     (7-17)  mg/dL


 


Glucose     (74-99)  mg/dL


 


POC Glucose (mg/dL)    118 H  (75-99)  mg/dL


 


Plasma Lactic Acid Max   4.8 H*   (0.7-2.0)  mmol/L


 


Calcium     (8.4-10.2)  mg/dL


 


AST     (14-36)  U/L


 


ALT     (4-34)  U/L


 


Total Protein     (6.3-8.2)  g/dL


 


Albumin     (3.5-5.0)  g/dL


 


Procalcitonin     (0.02-0.09)  ng/mL














  03/06/21 03/06/21 03/06/21 Range/Units





  15:31 15:51 17:03 


 


WBC     (3.8-10.6)  k/uL


 


Hct     (34.0-46.0)  %


 


MCHC     (31.0-37.0)  g/dL


 


Plt Count     (150-450)  k/uL


 


ABG pH  7.22 L    (7.35-7.45)  


 


ABG pCO2     (35-45)  mmHg


 


ABG pO2  74 L    ()  mmHg


 


ABG HCO3  16 L    (21-25)  mmol/L


 


ABG Total CO2  17 L    (19-24)  mmol/L


 


ABG O2 Saturation  93.0 L    (94-97)  %


 


Sodium     (137-145)  mmol/L


 


Potassium     (3.5-5.1)  mmol/L


 


Chloride     ()  mmol/L


 


Carbon Dioxide     (22-30)  mmol/L


 


BUN     (7-17)  mg/dL


 


Glucose     (74-99)  mg/dL


 


POC Glucose (mg/dL)    134 H  (75-99)  mg/dL


 


Plasma Lactic Acid Max   4.6 H*   (0.7-2.0)  mmol/L


 


Calcium     (8.4-10.2)  mg/dL


 


AST     (14-36)  U/L


 


ALT     (4-34)  U/L


 


Total Protein     (6.3-8.2)  g/dL


 


Albumin     (3.5-5.0)  g/dL


 


Procalcitonin     (0.02-0.09)  ng/mL














  03/06/21 03/07/21 03/07/21 Range/Units





  23:36 04:55 04:55 


 


WBC   24.6 H   (3.8-10.6)  k/uL


 


Hct   47.0 H   (34.0-46.0)  %


 


MCHC   30.1 L   (31.0-37.0)  g/dL


 


Plt Count     (150-450)  k/uL


 


ABG pH     (7.35-7.45)  


 


ABG pCO2     (35-45)  mmHg


 


ABG pO2     ()  mmHg


 


ABG HCO3     (21-25)  mmol/L


 


ABG Total CO2     (19-24)  mmol/L


 


ABG O2 Saturation     (94-97)  %


 


Sodium    136 L  (137-145)  mmol/L


 


Potassium    5.6 H  (3.5-5.1)  mmol/L


 


Chloride    110 H  ()  mmol/L


 


Carbon Dioxide    17 L  (22-30)  mmol/L


 


BUN    28 H  (7-17)  mg/dL


 


Glucose    184 H  (74-99)  mg/dL


 


POC Glucose (mg/dL)  142 H    (75-99)  mg/dL


 


Plasma Lactic Acid Max     (0.7-2.0)  mmol/L


 


Calcium    7.0 L  (8.4-10.2)  mg/dL


 


AST    166 H  (14-36)  U/L


 


ALT    46 H  (4-34)  U/L


 


Total Protein    5.9 L  (6.3-8.2)  g/dL


 


Albumin    2.5 L  (3.5-5.0)  g/dL


 


Procalcitonin     (0.02-0.09)  ng/mL














  03/07/21 03/07/21 Range/Units





  04:55 05:02 


 


WBC    (3.8-10.6)  k/uL


 


Hct    (34.0-46.0)  %


 


MCHC    (31.0-37.0)  g/dL


 


Plt Count    (150-450)  k/uL


 


ABG pH   7.18 L*  (7.35-7.45)  


 


ABG pCO2   46 H  (35-45)  mmHg


 


ABG pO2    ()  mmHg


 


ABG HCO3   17 L  (21-25)  mmol/L


 


ABG Total CO2   18 L  (19-24)  mmol/L


 


ABG O2 Saturation   98.2 H  (94-97)  %


 


Sodium    (137-145)  mmol/L


 


Potassium    (3.5-5.1)  mmol/L


 


Chloride    ()  mmol/L


 


Carbon Dioxide    (22-30)  mmol/L


 


BUN    (7-17)  mg/dL


 


Glucose    (74-99)  mg/dL


 


POC Glucose (mg/dL)  160 H   (75-99)  mg/dL


 


Plasma Lactic Acid Mxa    (0.7-2.0)  mmol/L


 


Calcium    (8.4-10.2)  mg/dL


 


AST    (14-36)  U/L


 


ALT    (4-34)  U/L


 


Total Protein    (6.3-8.2)  g/dL


 


Albumin    (3.5-5.0)  g/dL


 


Procalcitonin    (0.02-0.09)  ng/mL














Assessment and Plan


Plan: 











1 acute hypoxic respiratory failure secondary to COVID 19 related pneumonia.   

The patient was intubated on 03/06/2021 and currently she is a mechanical v

entilator.  The chest x-ray is a bit atypical and the patient's perihilar and 

right upper lobe and right lower lobe pulmonary infiltrate and some infiltration

of the left lung base.  The patient has mild elevation of the process for level.

 The patient is hypotensive.  The patient had severe lactic acidosis at time of 

admission and it's possible that she has a superinfection knowing that the white

cell count is also elevated at 24.  The patient is covered with antibiotics for 

now.  She is on Decadron.  She received a unit of convalescent plasma and his 

second unit is to follow today.  Not a candidate for of the severe due to her 

respiratory failure and high oxygen support and the low PF ratio





2 hypotension, resuscitated IV fluids, received a total of 3 L and currently 

normal saline running at 130s's an hour.  Consider underlying septic event or 

sepsis induced hypotension.  The patient is currently on norepinephrine infusion

running at 19 g per minute.  Adequate urine output.  The echocardiogram was 

performed and the patient is a preserved LV function with a normal ejection 

fraction.  No valvular abnormalities.  There is grade 2 diastolic heart failure.

 There is also a small generalized pericardial effusion.





3 Covid 19 related pneumonia





4 acute lactic acidosis, improving and the lactic acid level is down to 4.6.  

The patient did have a anion gap metabolic acidosis at time of admission





5 lymphopenia along with a component of thrombocytopenia, viral in nature.





6 elevation in the inflammatory markings including LDH and CRP.  There is also 

mild elevation of the d-dimer on follow-up levels are pending for now





7 diabetes mellitus





8 hypertension





9 paroxysmal atrial fibrillation current rhythm is sinus after being given 

Cardizem bolus.  No drips were utilized.





10 leukocytosis








Plan





Continue ventilator support.  On inspection of the mechanical ventilator, the 

patient does not have any significant elevation of the peak and static pressures

in her lungs are relatively compliance.  I put a right-sided volume of 450 on a 

VC plus modes of volume cycle and I dropped a respiratory rate down to 24 I also

drop the FiO2 down to 50% with a PEEP of 15.  The follow-up blood gases will be 

obtained.  With these adjustments, the patient seems to be much more comfortable

and a follow-up blood gases will be obtained around noontime.


Continue Decadron and give the patient another unit of convalescent plasma as 

mentioned, the patient is not a candidate for Remdesivir.  She may be a 

candidate for anti-interleukin-6, and this will be initiated over the next 24 

hours once infections have been ruled out.  Nevertheless, with her ongoing 

elevated protein calcitonin level and white cell count and hypotension, I would 

avoid giving her anti-interleukin-6 treatment at this point in time.  I would 

suggest repeating the pro calcitonin level.  Continue Zosyn and Levaquin.  Awai

ting cultures.  Obtain 1 more lactic acid level.  Wean off pressors.  Continue 

IV fluids at the same rate of 130s's an hour.  We'll continue to follow.  Her 

condition is critical.  Recheck inflammatory markers in a.m.  Continue Lovenox 

40 mg subcu for DVT prophylaxis.  Continue supportive care.  Provide vitamin C, 

vitamin D, and IV Pepcid.  Monitor the cardiac rhythm.  Start the patient she'll

feeds.  Keep her sedated for now.  Condition is very critical.  We'll continue 

to follow make further recommendations based on her progress.  Evaluation was 

done and more than 30 minutes.


Time with Patient: Greater than 30

## 2021-03-07 NOTE — P.PN
Subjective


Progress Note Date: 03/07/21








Treasure Wheeler, is a 81-year-old female patient of Dr. Falcon, who 

presented to [facility] emergency room with a chief complaint 

of worsening shortness of breath generalized weakness and confusion, symptoms 

started about 6 days prior to admission but her condition declined significantly

in the last 2 days.  Patient was evaluated in emergency room vital examination 

on presentation revealed a temperature of 98.4 pulse 133 respiration 30 blood 

pressure 135/67 pulse ox 55% on room air her white blood count was 3.8 

hemoglobin 15.9 platelet count 99 sodium 133 potassium 4.5 plasma lactic acid 

11.4 BUN 17 creatinine 0.9 arterial blood gases revealed a pH of 7.19 pCO2 49 by

mouth to 59 .  Chest x-ray done in the emergency room revealed moderate 

pulmonary edema that could relate to congestive heart failure or ARDS , EKG 

revealed sinus tachycardia with right bundle branch block , coronavirus PCR was 

positive.


patient was admitted to intensive care unit she was started on BiPAP.  Patient 

developed atrial fibrillation with rapid ventricular response with a heart rate 

of 170 she was started on Cardizem drip, she had episodes of hypotension, she 

was started on levophed drip, and earlier this morning she required intubation 

and mechanical ventilation.


Her past medical history is significant for history of hypertension, history of 

hyperlipidemia, history of insulin-dependent diabetes mellitus, and history of 

osteoporosis





On 03/07/2021 patient was seen and examined in the ICU she is intubated sedated 

maintained on mechanical ventilation she is on assist control rate of 30 FiO2 

60% and a PEEP of 15.  PH is 7.18 pCO2 46 by mouth 02/01/2008 lactic acid is 

down to 4.6 blood pressure 100/52 she is still maintained on levophed infusion, 

she is maintained on IV Decadron she received 1 unit of convalescent plasma, 

white blood count 24.6 hemoglobin 14.1 platelet count 181 sodium 136 potassium 

5.6 chloride 110 CO2 17 BUN 28 creatinine 1.2 glucose level 165





Objective





- Vital Signs


Vital signs: 


                                   Vital Signs











Temp  98.7 F   03/07/21 12:00


 


Pulse  100   03/07/21 15:00


 


Resp  24   03/07/21 15:00


 


BP  107/46   03/07/21 15:00


 


Pulse Ox  93 L  03/07/21 15:00








                                 Intake & Output











 03/06/21 03/07/21 03/07/21





 18:59 06:59 18:59


 


Intake Total 3359.880 2782.820 1799.668


 


Output Total 885 400 155


 


Balance 2474.880 2382.820 1644.668


 


Weight  82.6 kg 


 


Intake:   


 


  IV 2810 1800 1483


 


    0.9 Sodium Chloride 140 240 110


 


    Dexamethasone Sod   50





    Phosphate 20 mg In   





    Dextrose 5% in Water 50   





    ml @ 100 mls/hr IV DAILY   





    ENRIQUE Rx#:[id number]   


 


    Levofloxacin 750Mg-D5w   150





    Pmx 750 mg In Dextrose/   





    Water 1 150ml.bag @ 100   





    mls/hr IVPB Q48H ENRIQUE Rx#:   





    [id number]   


 


    Piperacillin-Tazobactam 3   100





    .375 gm In Sodium   





    Chloride 0.9% 100 ml @ 25   





    mls/hr IVPB Q8H ENRIQUE Rx#:   





    [id number]   


 


    Pressure Bag   33


 


    Sodium Chloride 0.9% 1,   1040





    000 ml @ 130 mls/hr IV .   





    Q7H42M ENRIQUE Rx#:[id number]   


 


    Sodium Chloride 0.9% 1, 1170 1560 





    000 ml @ 130 mls/hr IV .   





    Q7H42M STA Rx#:[id number]   


 


    Sodium Chloride 0.9% 1, 1500  





    000 ml @ 999 mls/hr IV .   





    Q1H1M STA Rx#:[id number]   


 


  Intake, IV Titration 357.880 982.820 316.668





  Amount   


 


    Dexmedetomidine/0.9% NaCl 12.733  





    (Pmx) 400 mcg In Empty   





    Bag 1 bag @ Titrate IV .   





    Q0M ENRIQUE Rx#:[id number]   


 


    Norepinephrine 4 mg In 254.000  





    Sodium Chloride 0.9% 250   





    ml @ 0.05 MCG/KG/MIN 14.   





    554 mls/hr IV .[id number]   





    ENRIQUE Rx#:[id number]   


 


    Norepinephrine 8 mg In 13.601 708.628 164.716





    Sodium Chloride 0.9% 250   





    ml @ 0.05 MCG/KG/MIN 7.   





    392 mls/hr IV .Q24H ENRIQUE   





    Rx#:[id number]   


 


    propofoL 1,000 mg In 77.546 274.192 151.952





    Empty Bag 1 bag @ Titrate   





    IV .Q0M ENRIQUE Rx#:   





    [id number]   


 


  Blood Product 192  


 


    Ffp Pher Conval Covid19 192  





    Acda 2  Unit   





    [id number]   


 


Output:   


 


  Gastric Drainage 500  


 


  Urine 385 400 155


 


Other:   


 


  Voiding Method Indwelling Catheter Indwelling Catheter Indwelling Catheter








                       ABP, PAP, CO, CI - Last Documented











Arterial Blood Pressure        102/41

















- Exam








Patient is intubated sedated maintained on mechanical ventilation


HEENT head normocephalic and atraumatic


Neck is supple no JVD no goiter no lymphadenopathy


Chest exam reveals a few scattered rhonchi no wheezing


Cardiac exam reveals regular heart sounds no gallops no murmurs


Abdomen is soft nontender no organomegaly with normal bowel sounds


Extremity exam reveals no edema no cyanosis or clubbing








- Labs


CBC & Chem 7: 


                                 03/07/21 04:55





                                 03/07/21 04:55


Labs: 


                  Abnormal Lab Results - Last 24 Hours (Table)











  03/06/21 03/06/21 03/06/21 Range/Units





  04:01 15:31 15:51 


 


WBC     (3.8-10.6)  k/uL


 


Hct     (34.0-46.0)  %


 


MCHC     (31.0-37.0)  g/dL


 


Neutrophils # (Manual)     (1.3-7.7)  k/uL


 


ABG pH   7.22 L   (7.35-7.45)  


 


ABG pCO2     (35-45)  mmHg


 


ABG pO2   74 L   ()  mmHg


 


ABG HCO3   16 L   (21-25)  mmol/L


 


ABG Total CO2   17 L   (19-24)  mmol/L


 


ABG O2 Saturation   93.0 L   (94-97)  %


 


ABG Lactic Acid     (0.5-1.6)  mmol/L


 


Sodium     (137-145)  mmol/L


 


Potassium     (3.5-5.1)  mmol/L


 


Chloride     ()  mmol/L


 


Carbon Dioxide     (22-30)  mmol/L


 


BUN     (7-17)  mg/dL


 


Glucose     (74-99)  mg/dL


 


POC Glucose (mg/dL)     (75-99)  mg/dL


 


Hemoglobin A1c     (4.0-6.0)  %


 


Plasma Lactic Acid Max    4.6 H*  (0.7-2.0)  mmol/L


 


Calcium     (8.4-10.2)  mg/dL


 


AST     (14-36)  U/L


 


ALT     (4-34)  U/L


 


Total Protein     (6.3-8.2)  g/dL


 


Albumin     (3.5-5.0)  g/dL


 


Procalcitonin  0.53 H    (0.02-0.09)  ng/mL














  03/06/21 03/06/21 03/07/21 Range/Units





  17:03 23:36 04:55 


 


WBC     (3.8-10.6)  k/uL


 


Hct     (34.0-46.0)  %


 


MCHC     (31.0-37.0)  g/dL


 


Neutrophils # (Manual)     (1.3-7.7)  k/uL


 


ABG pH     (7.35-7.45)  


 


ABG pCO2     (35-45)  mmHg


 


ABG pO2     ()  mmHg


 


ABG HCO3     (21-25)  mmol/L


 


ABG Total CO2     (19-24)  mmol/L


 


ABG O2 Saturation     (94-97)  %


 


ABG Lactic Acid     (0.5-1.6)  mmol/L


 


Sodium     (137-145)  mmol/L


 


Potassium     (3.5-5.1)  mmol/L


 


Chloride     ()  mmol/L


 


Carbon Dioxide     (22-30)  mmol/L


 


BUN     (7-17)  mg/dL


 


Glucose     (74-99)  mg/dL


 


POC Glucose (mg/dL)  134 H  142 H   (75-99)  mg/dL


 


Hemoglobin A1c    7.1 H  (4.0-6.0)  %


 


Plasma Lactic Acid Max     (0.7-2.0)  mmol/L


 


Calcium     (8.4-10.2)  mg/dL


 


AST     (14-36)  U/L


 


ALT     (4-34)  U/L


 


Total Protein     (6.3-8.2)  g/dL


 


Albumin     (3.5-5.0)  g/dL


 


Procalcitonin     (0.02-0.09)  ng/mL














  03/07/21 03/07/21 03/07/21 Range/Units





  04:55 04:55 04:55 


 


WBC  24.6 H    (3.8-10.6)  k/uL


 


Hct  47.0 H    (34.0-46.0)  %


 


MCHC  30.1 L    (31.0-37.0)  g/dL


 


Neutrophils # (Manual)  21.60 H    (1.3-7.7)  k/uL


 


ABG pH     (7.35-7.45)  


 


ABG pCO2     (35-45)  mmHg


 


ABG pO2     ()  mmHg


 


ABG HCO3     (21-25)  mmol/L


 


ABG Total CO2     (19-24)  mmol/L


 


ABG O2 Saturation     (94-97)  %


 


ABG Lactic Acid     (0.5-1.6)  mmol/L


 


Sodium   136 L   (137-145)  mmol/L


 


Potassium   5.6 H   (3.5-5.1)  mmol/L


 


Chloride   110 H   ()  mmol/L


 


Carbon Dioxide   17 L   (22-30)  mmol/L


 


BUN   28 H   (7-17)  mg/dL


 


Glucose   184 H   (74-99)  mg/dL


 


POC Glucose (mg/dL)    160 H  (75-99)  mg/dL


 


Hemoglobin A1c     (4.0-6.0)  %


 


Plasma Lactic Acid Max     (0.7-2.0)  mmol/L


 


Calcium   7.0 L   (8.4-10.2)  mg/dL


 


AST   166 H   (14-36)  U/L


 


ALT   46 H   (4-34)  U/L


 


Total Protein   5.9 L   (6.3-8.2)  g/dL


 


Albumin   2.5 L   (3.5-5.0)  g/dL


 


Procalcitonin     (0.02-0.09)  ng/mL














  03/07/21 03/07/21 03/07/21 Range/Units





  05:02 11:40 12:13 


 


WBC     (3.8-10.6)  k/uL


 


Hct     (34.0-46.0)  %


 


MCHC     (31.0-37.0)  g/dL


 


Neutrophils # (Manual)     (1.3-7.7)  k/uL


 


ABG pH  7.18 L*   7.23 L  (7.35-7.45)  


 


ABG pCO2  46 H    (35-45)  mmHg


 


ABG pO2    75 L  ()  mmHg


 


ABG HCO3  17 L   18 L  (21-25)  mmol/L


 


ABG Total CO2  18 L    (19-24)  mmol/L


 


ABG O2 Saturation  98.2 H    (94-97)  %


 


ABG Lactic Acid     (0.5-1.6)  mmol/L


 


Sodium     (137-145)  mmol/L


 


Potassium     (3.5-5.1)  mmol/L


 


Chloride     ()  mmol/L


 


Carbon Dioxide     (22-30)  mmol/L


 


BUN     (7-17)  mg/dL


 


Glucose     (74-99)  mg/dL


 


POC Glucose (mg/dL)   175 H   (75-99)  mg/dL


 


Hemoglobin A1c     (4.0-6.0)  %


 


Plasma Lactic Acid Max     (0.7-2.0)  mmol/L


 


Calcium     (8.4-10.2)  mg/dL


 


AST     (14-36)  U/L


 


ALT     (4-34)  U/L


 


Total Protein     (6.3-8.2)  g/dL


 


Albumin     (3.5-5.0)  g/dL


 


Procalcitonin     (0.02-0.09)  ng/mL














  03/07/21 03/07/21 Range/Units





  13:08 13:21 


 


WBC    (3.8-10.6)  k/uL


 


Hct    (34.0-46.0)  %


 


MCHC    (31.0-37.0)  g/dL


 


Neutrophils # (Manual)    (1.3-7.7)  k/uL


 


ABG pH    (7.35-7.45)  


 


ABG pCO2    (35-45)  mmHg


 


ABG pO2    ()  mmHg


 


ABG HCO3    (21-25)  mmol/L


 


ABG Total CO2    (19-24)  mmol/L


 


ABG O2 Saturation    (94-97)  %


 


ABG Lactic Acid   2.7 H*  (0.5-1.6)  mmol/L


 


Sodium    (137-145)  mmol/L


 


Potassium    (3.5-5.1)  mmol/L


 


Chloride    ()  mmol/L


 


Carbon Dioxide    (22-30)  mmol/L


 


BUN    (7-17)  mg/dL


 


Glucose    (74-99)  mg/dL


 


POC Glucose (mg/dL)  165 H   (75-99)  mg/dL


 


Hemoglobin A1c    (4.0-6.0)  %


 


Plasma Lactic Acid Max    (0.7-2.0)  mmol/L


 


Calcium    (8.4-10.2)  mg/dL


 


AST    (14-36)  U/L


 


ALT    (4-34)  U/L


 


Total Protein    (6.3-8.2)  g/dL


 


Albumin    (3.5-5.0)  g/dL


 


Procalcitonin    (0.02-0.09)  ng/mL








                      Microbiology - Last 24 Hours (Table)











 03/06/21 17:18 Gram Stain - Preliminary





 Sputum Sputum Culture - Preliminary














Assessment and Plan


Plan: 





1. Acute hypoxic respiratory failure, requiring intubation and mechanical 

ventilation





2. Poitive Covid 19 testing





3. bilateral pulmonary infiltrates, likely related to pneumonia due to Covid 19 

virus





4.  Episode of atrial fibrillation with rapid ventricular response





5. Acute lactic acidosis improving





6. Episode of hypotension improved with IV fluid and levophed drip





7. Underlying history of hypertension





8. Underlying history of hyperlipidemia





9. Underlying history of diabetes mellitus





Currently patient is intubated sedated maintained on mechanical ventilation


She is maintained on IV Decadron and subcu Lovenox


Will follow closely prognosis is guarded due to age and severity of illness

## 2021-03-08 LAB
ANION GAP SERPL CALC-SCNC: 2 MMOL/L
BUN SERPL-SCNC: 34 MG/DL (ref 7–17)
CALCIUM SPEC-MCNC: 7.2 MG/DL (ref 8.4–10.2)
CELLS COUNTED: 100
CHLORIDE SERPL-SCNC: 115 MMOL/L (ref 98–107)
CO2 BLDA-SCNC: 19 MMOL/L (ref 19–24)
CO2 SERPL-SCNC: 19 MMOL/L (ref 22–30)
ERYTHROCYTE [DISTWIDTH] IN BLOOD BY AUTOMATED COUNT: 4.08 M/UL (ref 3.8–5.4)
ERYTHROCYTE [DISTWIDTH] IN BLOOD: 14.3 % (ref 11.5–15.5)
GLUCOSE BLD-MCNC: 132 MG/DL (ref 75–99)
GLUCOSE BLD-MCNC: 151 MG/DL (ref 75–99)
GLUCOSE BLD-MCNC: 155 MG/DL (ref 75–99)
GLUCOSE BLD-MCNC: 163 MG/DL (ref 75–99)
GLUCOSE SERPL-MCNC: 169 MG/DL (ref 74–99)
HCO3 BLDA-SCNC: 18 MMOL/L (ref 21–25)
HCT VFR BLD AUTO: 39.7 % (ref 34–46)
HGB BLD-MCNC: 12.6 GM/DL (ref 11.4–16)
LDH SPEC-CCNC: 1814 U/L (ref 313–618)
LDH SPEC-CCNC: 2069 U/L (ref 313–618)
LYMPHOCYTES # BLD MANUAL: 0.59 K/UL (ref 1–4.8)
MCH RBC QN AUTO: 30.8 PG (ref 25–35)
MCHC RBC AUTO-ENTMCNC: 31.7 G/DL (ref 31–37)
MCV RBC AUTO: 97.2 FL (ref 80–100)
MONOCYTES # BLD MANUAL: 0.2 K/UL (ref 0–1)
NEUTROPHILS NFR BLD MANUAL: 86 %
NEUTS SEG # BLD MANUAL: 5.8 K/UL (ref 1.3–7.7)
PCO2 BLDA: 37 MMHG (ref 35–45)
PH BLDA: 7.3 [PH] (ref 7.35–7.45)
PLATELET # BLD AUTO: 107 K/UL (ref 150–450)
PO2 BLDA: 72 MMHG (ref 83–108)
POTASSIUM SERPL-SCNC: 4.9 MMOL/L (ref 3.5–5.1)
SODIUM SERPL-SCNC: 136 MMOL/L (ref 137–145)
WBC # BLD AUTO: 6.6 K/UL (ref 3.8–10.6)

## 2021-03-08 RX ADMIN — INSULIN ASPART SCH UNIT: 100 INJECTION, SOLUTION INTRAVENOUS; SUBCUTANEOUS at 18:08

## 2021-03-08 RX ADMIN — CHLORHEXIDINE GLUCONATE SCH ML: 1.2 RINSE ORAL at 20:28

## 2021-03-08 RX ADMIN — NOREPINEPHRINE BITARTRATE SCH MLS/HR: 1 INJECTION, SOLUTION, CONCENTRATE INTRAVENOUS at 09:18

## 2021-03-08 RX ADMIN — ENOXAPARIN SODIUM SCH MG: 40 INJECTION SUBCUTANEOUS at 09:20

## 2021-03-08 RX ADMIN — INSULIN ASPART SCH UNIT: 100 INJECTION, SOLUTION INTRAVENOUS; SUBCUTANEOUS at 12:42

## 2021-03-08 RX ADMIN — CEFAZOLIN SCH MLS/HR: 330 INJECTION, POWDER, FOR SOLUTION INTRAMUSCULAR; INTRAVENOUS at 12:34

## 2021-03-08 RX ADMIN — ALBUTEROL SULFATE SCH PUFF: 90 AEROSOL, METERED RESPIRATORY (INHALATION) at 14:52

## 2021-03-08 RX ADMIN — PIPERACILLIN AND TAZOBACTAM SCH MLS/HR: 3; .375 INJECTION, POWDER, FOR SOLUTION INTRAVENOUS at 02:19

## 2021-03-08 RX ADMIN — INSULIN ASPART SCH UNIT: 100 INJECTION, SOLUTION INTRAVENOUS; SUBCUTANEOUS at 06:36

## 2021-03-08 RX ADMIN — ALBUTEROL SULFATE SCH PUFF: 90 AEROSOL, METERED RESPIRATORY (INHALATION) at 07:38

## 2021-03-08 RX ADMIN — CHLORHEXIDINE GLUCONATE SCH ML: 1.2 RINSE ORAL at 07:50

## 2021-03-08 RX ADMIN — ALBUTEROL SULFATE SCH PUFF: 90 AEROSOL, METERED RESPIRATORY (INHALATION) at 19:44

## 2021-03-08 RX ADMIN — CEFAZOLIN SCH: 330 INJECTION, POWDER, FOR SOLUTION INTRAMUSCULAR; INTRAVENOUS at 22:57

## 2021-03-08 RX ADMIN — CEFAZOLIN SCH MLS/HR: 330 INJECTION, POWDER, FOR SOLUTION INTRAMUSCULAR; INTRAVENOUS at 22:33

## 2021-03-08 RX ADMIN — ALBUTEROL SULFATE SCH PUFF: 90 AEROSOL, METERED RESPIRATORY (INHALATION) at 11:01

## 2021-03-08 RX ADMIN — PIPERACILLIN AND TAZOBACTAM SCH MLS/HR: 3; .375 INJECTION, POWDER, FOR SOLUTION INTRAVENOUS at 17:55

## 2021-03-08 RX ADMIN — DEXTROSE SCH MLS/HR: 50 INJECTION, SOLUTION INTRAVENOUS at 09:20

## 2021-03-08 RX ADMIN — PIPERACILLIN AND TAZOBACTAM SCH MLS/HR: 3; .375 INJECTION, POWDER, FOR SOLUTION INTRAVENOUS at 09:20

## 2021-03-08 NOTE — P.PN
Subjective


Progress Note Date: 03/08/21


Principal diagnosis: 





Acute hypoxic respiratory failure secondary to Covid 19 pneumonitis.








this 81-year-old female patient, known history of diabetes and hypertension, was

brought into the emergency department upon the request of her family because of 

generalized weakness, falls and confusion x6 days.  Her condition declined and 

the patient decompensated over the past 48-72 hours.  This was rather a quick 

deterioration in her condition.  She had been having also shortness of breath 

for the past 2 days..  In the emergency department, the initial vitals showed 

that the patient was profoundly hypoxic with a pulse ox of 50%.  At that point, 

the patient also had blood work that showed severe lactic acidosis with a lactic

acid level of 11.  She wasgiven a chest x-ray that showed diffuse bilateral 

pulmonary infiltrates.  The patient was given a bolus of 1 L of IV fluid and 

following that she was moved to the intensive care unit.  The patient was kept 

on BiPAP throughout the night and currently she is on a BiPAP pressure of 14/7 

cm of water with an FiO2 of 100%.  Her FiO2 was 85%.  She is responsive, however

she is quite tachypneic and she is breathing in the mid 30s and she is 

struggling with her breathing and earlier this morning she started getting more 

restless and agitated.  We had to start on Precedex which is currently running 

at 0.4 mcg/kg/h.  She seems to be much more comfortable while on Precedex.had a 

bout of atrial fibrillation with rapid ventricular response in the ICU.  The 

heart rate went up to 170 range.  She was given 20 mg of Cardizem IV push.  She 

converted back to normal sinus rhythm.  She became hypotensive and her systolic 

blood pressure in the mid 60s.  She was given additional 2 L of IV fluids and 

the patient is currently on normal saline running at 30 mL an hour.  She will be

also started on norepinephrine infusion.  Lactic acid level is down to 6 based 

on the follow-up blood work.the blood gas showed a pH of 7.29 with a pCO2 of 37 

and pO2 of 80.  This was done a bipolar or 14/7 with an FiO2 of 100%.  Chest x-

ray showing diffuse breath and pulmonary infiltrates.  D-dimer is at 1.09.  The 

patient has a LDH of 1724, CRP level is 77, troponin is at 0.118, the proBNP 

level is 330, the patient has a lactic acid level of 11.4 at time of admission 

her lactic acid level is down to 6.7 and she has an anion gap metabolic acidosis

with a serum bicarb of 14 and a gap of 19.  UA is positive for glucose, +4, and 

COVID19 testing was positive.  As such, her presentation is consistent with 

COVID 19 pneumonia.








03/07/2021, the patient remains intubated on a mechanical ventilator.  The 

patient was intubated yesterday for an acute hypoxic respiratory failure and she

was placed on mechanical ventilator she is currently off of a running at 40 

mcg/kg per minute.  The patient is on no paralytics.  She is quite successful 

mechanical ventilator.  She is an assist-control mode at the rate of 30 with a 

tidal volume of 375 and FiO2 of 60% with a PEEP of this.  The chest x-ray from 

today is showing bilateral perihilar and lower lobe in addition to right upper 

lobe pulmonary rate.  ET tube is in a good location.  It is seen around 2 cm 

above the dasia.  The patient also has a left internal jugular.  Some today 

showed a pH of 7.18 with a pCO2 of 46 and pO2 of 108 and this wasn't an FiO2 of 

60%.  Meanwhile, the patient was quite hypotensive and acidotic and the patient 

had a very high lactic acid level of 11.4 which dropped down to 4.6 with fluid 

resuscitation.  Overall, the patient received a total of 3 L of IV fluid in the 

form of normal saline and the patient is currently running at 130s's an hour of 

normal saline.  Urine output is in order of 30-40 mL an hour and the net fluid 

balance is been +4.8 L over the past 24 hours.  As such the patient is well 

resuscitated.  Most recent blood pressure is 95/44 and the patient is currently 

on pressors at 19 mcg/ per minute of norepinephrine infusion.  The procalcitonin

level was at 0.53 and blood cultures and sputum cultures were sent and the 

patient was covered with empiric antibiotics and the patient was given cefepime 

and Levaquin.  White cell count today's at 24.  She was given Decadron.  She was

given convalescent plasma one unit..  The renal function is stable.  E

lectrolytes are stable.  Potassium level is at 5.6 and this needs to be 

repeated.  Serum bicarb is at 17 which is improving.  Less lactic acid level 

from yesterday was at 4.6.  The patient's blood sugar is at 160 and she is on 

sliding scale coverage for blood sugar control.  Cardiac rhythm is sinus.  No 

further bouts of atrial fibrillation.  She did have one a 1 bouts yesterday 

prior to intubation and she converted back into normal sinus rhythm without any 

major difficulties.





Patient was reevaluated today on 3/8/2021, remains in the ICU, intubated and 

mechanically ventilated.  Patient is on volume control plus, tidal volume is 

450, rate is 24, FiO2 is 60%, PEEP is at 15.  ABG showed a pO2 of 72 pCO2 of 37 

pH of 7.30.  Patient is on propofol at 30 mcg/kg/m, off norepinephrine, IV fluid

is at 13 0 mL per hour.  Patient is on enteral feeding patient received 1 unit 

of convalescent plasma she was out of the window for remdesivir, she is on 

Decadron 20 mg daily, and she is also on Lovenox 40 mg subcu daily.  Today, 

after reviewing her ABG and reviewed her chest x-ray, I felt there is no need to

make any major changes in her ventilator settings.  Hence no changes were made, 

and we will plan to continue present supportive care measures.  Patient remains 

empirically on antibiotics.  Her bicarb is improving not requiring any bicarb 

drip.  She remains on sliding scale coverage for blood sugar.  Presently in 

sinus rhythm, no further episodes of atrial fibrillation.  Chest x-ray continues

to show bilateral multifocal infiltrates with cardiomegaly.  CBC is relatively 

normal.  D-dimer is 1.77.  Basic metabolic profile is normal.  Renal profile is 

normal.  LDH is 1814 and C-reactive protein is 76.3.  Pro-calcitonin is elevated

at 0.74.  Levaquin and Zosyn and remain on board.








Objective





- Vital Signs


Vital signs: 


                                   Vital Signs











Temp  98.9 F   03/08/21 12:00


 


Pulse  84   03/08/21 13:00


 


Resp  25 H  03/08/21 13:00


 


BP  99/45   03/08/21 13:00


 


Pulse Ox  93 L  03/08/21 13:00








                                 Intake & Output











 03/07/21 03/08/21 03/08/21





 18:59 06:59 18:59


 


Intake Total 2646.774 2287.039 1197.875


 


Output Total 370 491 145


 


Balance 2276.774 4823.371 0163.875


 


Weight  88.1 kg 88.1 kg


 


Intake:   


 


  IV 2107 1868 1006


 


    0.9 Sodium Chloride 190  40


 


    Dexamethasone Sod 50  50





    Phosphate 20 mg In   





    Dextrose 5% in Water 50   





    ml @ 100 mls/hr IV DAILY   





    [facility] Rx#:[id number]   


 


    Levofloxacin 750Mg-D5w 150  





    Pmx 750 mg In Dextrose/   





    Water 1 150ml.bag @ 100   





    mls/hr IVPB Q48H [facility] Rx#:   





    [id number]   


 


    Piperacillin-Tazobactam 3 100 100 100





    .375 gm In Sodium   





    Chloride 0.9% 100 ml @ 25   





    mls/hr IVPB Q8H [facility] Rx#:   





    [id number]   


 


    Pressure Bag 57 78 36


 


    Sodium Chloride 0.9% 1, 1560 1690 780





    000 ml @ 130 mls/hr IV .   





    Q7H42M [facility] Rx#:[id number]   


 


  Intake, IV Titration 539.774 219.039 71.875





  Amount   


 


    Norepinephrine 8 mg In 339.774 82.942 1.972





    Sodium Chloride 0.9% 250   





    ml @ 0.05 MCG/KG/MIN 7.   





    392 mls/hr IV .Q24H [facility]   





    Rx#:[id number]   


 


    propofoL 1,000 mg In 200.000 136.097 69.903





    Empty Bag 1 bag @ Titrate   





    IV .Q0M [facility] Rx#:   





    [id number]   


 


  Tube Feeding  110 60


 


  Other  90 60


 


Output:   


 


  Urine 370 491 145


 


Other:   


 


  Voiding Method Indwelling Catheter Indwelling Catheter Indwelling Catheter








                       ABP, PAP, CO, CI - Last Documented











Arterial Blood Pressure        99/38

















- Exam





Physical Exam: Revealed a 81-year-old female in no distress, ventilated, 

sedated, comfortable.


Head: Atraumatic, normocephalic


HEENT:[Neck is supple.] [No neck masses.] [No thyromegaly.] [No JVD.]  The 

tracheal tube and orogastric tubes are intact.


Chest: [Clear throughout, minimal crackles at the bases no rhonchi and no 

wheezes.


Cardiac Exam: [Normal S1 and S2, no S3 gallop, no murmur.]


Abdomen: [Soft, nontender,  no megaly, no rebound, no guarding, normal bowel 

sounds.]


Extremities: [No clubbing, no edema, no cyanosis.]


Neurological Exam: Could not assess, patient is sedated and maintained on 

propofol.  She is calm and comfortable, pupils are equally reactive to light.


Psychiatric: Could not be assessed.


Skin: No rashes.





- Labs


CBC & Chem 7: 


                                 03/08/21 03:18





                                 03/08/21 03:18


Labs: 


                  Abnormal Lab Results - Last 24 Hours (Table)











  03/07/21 03/07/21 03/07/21 Range/Units





  04:55 04:55 13:21 


 


Plt Count     (150-450)  k/uL


 


Lymphocytes # (Manual)     (1.0-4.8)  k/uL


 


D-Dimer     (<0.60)  mg/L FEU


 


ABG pH     (7.35-7.45)  


 


ABG pO2     ()  mmHg


 


ABG HCO3     (21-25)  mmol/L


 


ABG Lactic Acid    2.7 H*  (0.5-1.6)  mmol/L


 


Sodium     (137-145)  mmol/L


 


Potassium     (3.5-5.1)  mmol/L


 


Chloride     ()  mmol/L


 


Carbon Dioxide     (22-30)  mmol/L


 


BUN     (7-17)  mg/dL


 


Glucose     (74-99)  mg/dL


 


POC Glucose (mg/dL)     (75-99)  mg/dL


 


Hemoglobin A1c  7.1 H    (4.0-6.0)  %


 


Calcium     (8.4-10.2)  mg/dL


 


Lactate Dehydrogenase   2069 H   (313-618)  U/L


 


C-Reactive Protein   148.2 H   (<10.0)  mg/L


 


Procalcitonin     (0.02-0.09)  ng/mL














  03/07/21 03/07/21 03/07/21 Range/Units





  17:18 17:36 23:40 


 


Plt Count     (150-450)  k/uL


 


Lymphocytes # (Manual)     (1.0-4.8)  k/uL


 


D-Dimer     (<0.60)  mg/L FEU


 


ABG pH     (7.35-7.45)  


 


ABG pO2     ()  mmHg


 


ABG HCO3     (21-25)  mmol/L


 


ABG Lactic Acid     (0.5-1.6)  mmol/L


 


Sodium     (137-145)  mmol/L


 


Potassium  5.5 H    (3.5-5.1)  mmol/L


 


Chloride     ()  mmol/L


 


Carbon Dioxide     (22-30)  mmol/L


 


BUN     (7-17)  mg/dL


 


Glucose     (74-99)  mg/dL


 


POC Glucose (mg/dL)   224 H  157 H  (75-99)  mg/dL


 


Hemoglobin A1c     (4.0-6.0)  %


 


Calcium     (8.4-10.2)  mg/dL


 


Lactate Dehydrogenase     (313-618)  U/L


 


C-Reactive Protein     (<10.0)  mg/L


 


Procalcitonin     (0.02-0.09)  ng/mL














  03/08/21 03/08/21 03/08/21 Range/Units





  03:18 03:18 03:18 


 


Plt Count     (150-450)  k/uL


 


Lymphocytes # (Manual)     (1.0-4.8)  k/uL


 


D-Dimer   1.77 H   (<0.60)  mg/L FEU


 


ABG pH     (7.35-7.45)  


 


ABG pO2     ()  mmHg


 


ABG HCO3     (21-25)  mmol/L


 


ABG Lactic Acid     (0.5-1.6)  mmol/L


 


Sodium     (137-145)  mmol/L


 


Potassium     (3.5-5.1)  mmol/L


 


Chloride     ()  mmol/L


 


Carbon Dioxide     (22-30)  mmol/L


 


BUN     (7-17)  mg/dL


 


Glucose     (74-99)  mg/dL


 


POC Glucose (mg/dL)     (75-99)  mg/dL


 


Hemoglobin A1c     (4.0-6.0)  %


 


Calcium     (8.4-10.2)  mg/dL


 


Lactate Dehydrogenase    1814 H  (313-618)  U/L


 


C-Reactive Protein    76.3 H  (<10.0)  mg/L


 


Procalcitonin  0.74 H    (0.02-0.09)  ng/mL














  03/08/21 03/08/21 03/08/21 Range/Units





  03:18 03:18 05:17 


 


Plt Count   107 L   (150-450)  k/uL


 


Lymphocytes # (Manual)   0.59 L   (1.0-4.8)  k/uL


 


D-Dimer     (<0.60)  mg/L FEU


 


ABG pH    7.30 L  (7.35-7.45)  


 


ABG pO2    72 L  ()  mmHg


 


ABG HCO3    18 L  (21-25)  mmol/L


 


ABG Lactic Acid     (0.5-1.6)  mmol/L


 


Sodium  136 L    (137-145)  mmol/L


 


Potassium     (3.5-5.1)  mmol/L


 


Chloride  115 H    ()  mmol/L


 


Carbon Dioxide  19 L    (22-30)  mmol/L


 


BUN  34 H    (7-17)  mg/dL


 


Glucose  169 H    (74-99)  mg/dL


 


POC Glucose (mg/dL)     (75-99)  mg/dL


 


Hemoglobin A1c     (4.0-6.0)  %


 


Calcium  7.2 L    (8.4-10.2)  mg/dL


 


Lactate Dehydrogenase     (313-618)  U/L


 


C-Reactive Protein     (<10.0)  mg/L


 


Procalcitonin     (0.02-0.09)  ng/mL














  03/08/21 03/08/21 03/08/21 Range/Units





  06:14 11:52 12:39 


 


Plt Count     (150-450)  k/uL


 


Lymphocytes # (Manual)     (1.0-4.8)  k/uL


 


D-Dimer     (<0.60)  mg/L FEU


 


ABG pH     (7.35-7.45)  


 


ABG pO2     ()  mmHg


 


ABG HCO3     (21-25)  mmol/L


 


ABG Lactic Acid     (0.5-1.6)  mmol/L


 


Sodium     (137-145)  mmol/L


 


Potassium     (3.5-5.1)  mmol/L


 


Chloride     ()  mmol/L


 


Carbon Dioxide     (22-30)  mmol/L


 


BUN     (7-17)  mg/dL


 


Glucose     (74-99)  mg/dL


 


POC Glucose (mg/dL)  151 H  163 H  132 H  (75-99)  mg/dL


 


Hemoglobin A1c     (4.0-6.0)  %


 


Calcium     (8.4-10.2)  mg/dL


 


Lactate Dehydrogenase     (313-618)  U/L


 


C-Reactive Protein     (<10.0)  mg/L


 


Procalcitonin     (0.02-0.09)  ng/mL








                      Microbiology - Last 24 Hours (Table)











 03/06/21 16:43 Blood Culture - Preliminary





 Blood    No Growth after 24 hours


 


 03/06/21 17:18 Gram Stain - Preliminary





 Sputum Sputum Culture - Preliminary














Assessment and Plan


Assessment: 





Impression:


Acute hypoxic respiratory failure secondary to covid 19 pneumonitis.


Lymphopenia with thrombocytopenia secondary to above.


Elevated inflammatory markers


Type 2 diabetes.


Benign essential hypertension.


Paroxysmal atrial fibrillation.


Hypovolemic hypotension, responded well to IV fluids.  Required pressors for a 

brief period of time.





Recommendation:


Continue ventilatory support.  No changes were recommended today


Continue Covid 19 cocktail.


Continue Decadron.


Patient was not a candidate for remdesivir and anti-interleukin-6.


Patient received 2 units of convalescent plasma.


Continue nutritional support.


Continue GI and DVT prophylaxis.


Patient is critically ill, considering her ventilatory settings high PEEP and 

high FiO2, not ready for any form of weaning.


Critical care time is 35 minutes.  We'll continue to follow.








Time with Patient: Greater than 30

## 2021-03-08 NOTE — XR
EXAMINATION TYPE: XR chest 1V portable

 

DATE OF EXAM: 3/8/2021

 

CLINICAL HISTORY: Difficulty breathing progress study.  

 

TECHNIQUE: Single AP portable semiupright view of the chest is obtained.

 

COMPARISON: Chest x-ray from one day earlier and older studies.

 

FINDINGS:  Stable endotracheal and orogastric tubes. Stable left internal jugular central venous cath
eter. Persistent cardiomegaly with atherosclerotic thoracic aorta. Background reticular interstitial 
changes bilaterally with increased central and lower lung opacities. Tiny left pleural effusion. Unde
rlying scoliotic curvature or positioning.

 

IMPRESSION: Bilateral multifocal infiltrates and/or edema on background cardiomegaly and chronic pare
nchymal changes redemonstrated. No significant change from one day earlier.

## 2021-03-08 NOTE — P.PN
Subjective


Progress Note Date: 03/08/21








Treasure Wheeler, is a 81-year-old female patient of Dr. Falcon, who 

presented to [facility] emergency room with a chief complaint 

of worsening shortness of breath generalized weakness and confusion, symptoms 

started about 6 days prior to admission but her condition declined significantly

in the last 2 days.  Patient was evaluated in emergency room vital examination 

on presentation revealed a temperature of 98.4 pulse 133 respiration 30 blood 

pressure 135/67 pulse ox 55% on room air her white blood count was 3.8 

hemoglobin 15.9 platelet count 99 sodium 133 potassium 4.5 plasma lactic acid 

11.4 BUN 17 creatinine 0.9 arterial blood gases revealed a pH of 7.19 pCO2 49 by

mouth to 59 .  Chest x-ray done in the emergency room revealed moderate 

pulmonary edema that could relate to congestive heart failure or ARDS , EKG 

revealed sinus tachycardia with right bundle branch block , coronavirus PCR was 

positive.


patient was admitted to intensive care unit she was started on BiPAP.  Patient 

developed atrial fibrillation with rapid ventricular response with a heart rate 

of 170 she was started on Cardizem drip, she had episodes of hypotension, she 

was started on levophed drip, and earlier this morning she required intubation 

and mechanical ventilation.


Her past medical history is significant for history of hypertension, history of 

hyperlipidemia, history of insulin-dependent diabetes mellitus, and history of 

osteoporosis





On 03/07/2021 patient was seen and examined in the ICU she is intubated sedated 

maintained on mechanical ventilation she is on assist control rate of 30 FiO2 

60% and a PEEP of 15.  PH is 7.18 pCO2 46 by mouth 02/01/2008 lactic acid is 

down to 4.6 blood pressure 100/52 she is still maintained on levophed infusion, 

she is maintained on IV Decadron she received 1 unit of convalescent plasma, 

white blood count 24.6 hemoglobin 14.1 platelet count 181 sodium 136 potassium 

5.6 chloride 110 CO2 17 BUN 28 creatinine 1.2 glucose level 165





On 03/08/2021 patient was seen and examined in the ICU she is intubated sedated 

maintained on mechanical ventilation, today she was taken off of vasopressors 

and has been tolerating well blood pressure at this time 98/55, pH is 7.30 pCO2 

37 by mouth to 72 white blood count is down to 6.6 hemoglobin 12.6 platelet cou

nt 107 d-dimer is 1.77 BUN 34 creatinine 0.96





Objective





- Vital Signs


Vital signs: 


                                   Vital Signs











Temp  99.1 F   03/08/21 08:00


 


Pulse  87   03/08/21 11:00


 


Resp  26 H  03/08/21 11:00


 


BP  102/49   03/08/21 10:30


 


Pulse Ox  93 L  03/08/21 11:00








                                 Intake & Output











 03/07/21 03/08/21 03/08/21





 18:59 06:59 18:59


 


Intake Total 2646.774 2287.039 875.086


 


Output Total 370 491 90


 


Balance 2276.774 1796.039 785.086


 


Weight  88.1 kg 88.1 kg


 


Intake:   


 


  IV 2107 1868 734


 


    0.9 Sodium Chloride 190  40


 


    Dexamethasone Sod 50  50





    Phosphate 20 mg In   





    Dextrose 5% in Water 50   





    ml @ 100 mls/hr IV DAILY   





    [facility] Rx#:[id number]   


 


    Levofloxacin 750Mg-D5w 150  





    Pmx 750 mg In Dextrose/   





    Water 1 150ml.bag @ 100   





    mls/hr IVPB Q48H [facility] Rx#:   





    [id number]   


 


    Piperacillin-Tazobactam 3 100 100 100





    .375 gm In Sodium   





    Chloride 0.9% 100 ml @ 25   





    mls/hr IVPB Q8H [facility] Rx#:   





    [id number]   


 


    Pressure Bag 57 78 24


 


    Sodium Chloride 0.9% 1, 1560 1690 520





    000 ml @ 130 mls/hr IV .   





    Q7H42M [facility] Rx#:[id number]   


 


  Intake, IV Titration 539.774 219.039 71.086





  Amount   


 


    Norepinephrine 8 mg In 339.774 82.942 1.183





    Sodium Chloride 0.9% 250   





    ml @ 0.05 MCG/KG/MIN 7.   





    392 mls/hr IV .Q24H [facility]   





    Rx#:[id number]   


 


    propofoL 1,000 mg In 200.000 136.097 69.903





    Empty Bag 1 bag @ Titrate   





    IV .Q0M [facility] Rx#:   





    [id number]   


 


  Tube Feeding  110 40


 


  Other  90 30


 


Output:   


 


  Urine 370 491 90


 


Other:   


 


  Voiding Method Indwelling Catheter Indwelling Catheter Indwelling Catheter








                       ABP, PAP, CO, CI - Last Documented











Arterial Blood Pressure        104/37

















- Exam








Patient is intubated sedated maintained on mechanical ventilation


HEENT head normocephalic and atraumatic


Neck is supple no JVD no goiter no lymphadenopathy


Chest exam reveals a few scattered rhonchi no wheezing


Cardiac exam reveals regular heart sounds no gallops no murmurs


Abdomen is soft nontender no organomegaly with normal bowel sounds


Extremity exam reveals no edema no cyanosis or clubbing








- Labs


CBC & Chem 7: 


                                 03/08/21 03:18





                                 03/08/21 03:18


Labs: 


                  Abnormal Lab Results - Last 24 Hours (Table)











  03/07/21 03/07/21 03/07/21 Range/Units





  04:55 04:55 13:08 


 


Plt Count     (150-450)  k/uL


 


Lymphocytes # (Manual)     (1.0-4.8)  k/uL


 


D-Dimer     (<0.60)  mg/L FEU


 


ABG pH     (7.35-7.45)  


 


ABG pO2     ()  mmHg


 


ABG HCO3     (21-25)  mmol/L


 


ABG Lactic Acid     (0.5-1.6)  mmol/L


 


Sodium     (137-145)  mmol/L


 


Potassium     (3.5-5.1)  mmol/L


 


Chloride     ()  mmol/L


 


Carbon Dioxide     (22-30)  mmol/L


 


BUN     (7-17)  mg/dL


 


Glucose     (74-99)  mg/dL


 


POC Glucose (mg/dL)    165 H  (75-99)  mg/dL


 


Hemoglobin A1c  7.1 H    (4.0-6.0)  %


 


Calcium     (8.4-10.2)  mg/dL


 


Lactate Dehydrogenase   2069 H   (313-618)  U/L


 


C-Reactive Protein   148.2 H   (<10.0)  mg/L


 


Procalcitonin     (0.02-0.09)  ng/mL














  03/07/21 03/07/21 03/07/21 Range/Units





  13:21 17:18 17:36 


 


Plt Count     (150-450)  k/uL


 


Lymphocytes # (Manual)     (1.0-4.8)  k/uL


 


D-Dimer     (<0.60)  mg/L FEU


 


ABG pH     (7.35-7.45)  


 


ABG pO2     ()  mmHg


 


ABG HCO3     (21-25)  mmol/L


 


ABG Lactic Acid  2.7 H*    (0.5-1.6)  mmol/L


 


Sodium     (137-145)  mmol/L


 


Potassium   5.5 H   (3.5-5.1)  mmol/L


 


Chloride     ()  mmol/L


 


Carbon Dioxide     (22-30)  mmol/L


 


BUN     (7-17)  mg/dL


 


Glucose     (74-99)  mg/dL


 


POC Glucose (mg/dL)    224 H  (75-99)  mg/dL


 


Hemoglobin A1c     (4.0-6.0)  %


 


Calcium     (8.4-10.2)  mg/dL


 


Lactate Dehydrogenase     (313-618)  U/L


 


C-Reactive Protein     (<10.0)  mg/L


 


Procalcitonin     (0.02-0.09)  ng/mL














  03/07/21 03/08/21 03/08/21 Range/Units





  23:40 03:18 03:18 


 


Plt Count     (150-450)  k/uL


 


Lymphocytes # (Manual)     (1.0-4.8)  k/uL


 


D-Dimer    1.77 H  (<0.60)  mg/L FEU


 


ABG pH     (7.35-7.45)  


 


ABG pO2     ()  mmHg


 


ABG HCO3     (21-25)  mmol/L


 


ABG Lactic Acid     (0.5-1.6)  mmol/L


 


Sodium     (137-145)  mmol/L


 


Potassium     (3.5-5.1)  mmol/L


 


Chloride     ()  mmol/L


 


Carbon Dioxide     (22-30)  mmol/L


 


BUN     (7-17)  mg/dL


 


Glucose     (74-99)  mg/dL


 


POC Glucose (mg/dL)  157 H    (75-99)  mg/dL


 


Hemoglobin A1c     (4.0-6.0)  %


 


Calcium     (8.4-10.2)  mg/dL


 


Lactate Dehydrogenase     (313-618)  U/L


 


C-Reactive Protein     (<10.0)  mg/L


 


Procalcitonin   0.74 H   (0.02-0.09)  ng/mL














  03/08/21 03/08/21 03/08/21 Range/Units





  03:18 03:18 03:18 


 


Plt Count    107 L  (150-450)  k/uL


 


Lymphocytes # (Manual)    0.59 L  (1.0-4.8)  k/uL


 


D-Dimer     (<0.60)  mg/L FEU


 


ABG pH     (7.35-7.45)  


 


ABG pO2     ()  mmHg


 


ABG HCO3     (21-25)  mmol/L


 


ABG Lactic Acid     (0.5-1.6)  mmol/L


 


Sodium   136 L   (137-145)  mmol/L


 


Potassium     (3.5-5.1)  mmol/L


 


Chloride   115 H   ()  mmol/L


 


Carbon Dioxide   19 L   (22-30)  mmol/L


 


BUN   34 H   (7-17)  mg/dL


 


Glucose   169 H   (74-99)  mg/dL


 


POC Glucose (mg/dL)     (75-99)  mg/dL


 


Hemoglobin A1c     (4.0-6.0)  %


 


Calcium   7.2 L   (8.4-10.2)  mg/dL


 


Lactate Dehydrogenase  1814 H    (313-618)  U/L


 


C-Reactive Protein  76.3 H    (<10.0)  mg/L


 


Procalcitonin     (0.02-0.09)  ng/mL














  03/08/21 03/08/21 03/08/21 Range/Units





  05:17 06:14 11:52 


 


Plt Count     (150-450)  k/uL


 


Lymphocytes # (Manual)     (1.0-4.8)  k/uL


 


D-Dimer     (<0.60)  mg/L FEU


 


ABG pH  7.30 L    (7.35-7.45)  


 


ABG pO2  72 L    ()  mmHg


 


ABG HCO3  18 L    (21-25)  mmol/L


 


ABG Lactic Acid     (0.5-1.6)  mmol/L


 


Sodium     (137-145)  mmol/L


 


Potassium     (3.5-5.1)  mmol/L


 


Chloride     ()  mmol/L


 


Carbon Dioxide     (22-30)  mmol/L


 


BUN     (7-17)  mg/dL


 


Glucose     (74-99)  mg/dL


 


POC Glucose (mg/dL)   151 H  163 H  (75-99)  mg/dL


 


Hemoglobin A1c     (4.0-6.0)  %


 


Calcium     (8.4-10.2)  mg/dL


 


Lactate Dehydrogenase     (313-618)  U/L


 


C-Reactive Protein     (<10.0)  mg/L


 


Procalcitonin     (0.02-0.09)  ng/mL








                      Microbiology - Last 24 Hours (Table)











 03/06/21 16:43 Blood Culture - Preliminary





 Blood    No Growth after 24 hours


 


 03/06/21 17:18 Gram Stain - Preliminary





 Sputum Sputum Culture - Preliminary














Assessment and Plan


Plan: 





1. Acute hypoxic respiratory failure, requiring intubation and mechanical 

ventilation





2. Poitive Covid 19 testing





3. bilateral pulmonary infiltrates, likely related to pneumonia due to Covid 19 

virus





4.  Episode of atrial fibrillation with rapid ventricular response





5. Acute lactic acidosis improving





6. Episode of hypotension improved with IV fluid and levophed drip





7. Underlying history of hypertension





8. Underlying history of hyperlipidemia





9. Underlying history of diabetes mellitus





Currently patient is intubated sedated maintained on mechanical ventilation


She is maintained on IV Decadron and subcu Lovenox


Will follow closely prognosis is guarded due to age and severity of illness

## 2021-03-09 LAB
ALBUMIN SERPL-MCNC: 2 G/DL (ref 3.5–5)
ALP SERPL-CCNC: 73 U/L (ref 38–126)
ALT SERPL-CCNC: 38 U/L (ref 4–34)
ANION GAP SERPL CALC-SCNC: 1 MMOL/L
AST SERPL-CCNC: 71 U/L (ref 14–36)
BUN SERPL-SCNC: 37 MG/DL (ref 7–17)
CALCIUM SPEC-MCNC: 7.7 MG/DL (ref 8.4–10.2)
CELLS COUNTED: 200
CHLORIDE SERPL-SCNC: 118 MMOL/L (ref 98–107)
CK SERPL-CCNC: 186 U/L (ref 30–135)
CO2 BLDA-SCNC: 20 MMOL/L (ref 19–24)
CO2 SERPL-SCNC: 19 MMOL/L (ref 22–30)
ERYTHROCYTE [DISTWIDTH] IN BLOOD BY AUTOMATED COUNT: 4.12 M/UL (ref 3.8–5.4)
ERYTHROCYTE [DISTWIDTH] IN BLOOD: 14.5 % (ref 11.5–15.5)
FERRITIN SERPL-MCNC: 258.5 NG/ML (ref 10–291)
GLUCOSE BLD-MCNC: 167 MG/DL (ref 75–99)
GLUCOSE BLD-MCNC: 198 MG/DL (ref 75–99)
GLUCOSE BLD-MCNC: 202 MG/DL (ref 75–99)
GLUCOSE BLD-MCNC: 204 MG/DL (ref 75–99)
GLUCOSE SERPL-MCNC: 211 MG/DL (ref 74–99)
HCO3 BLDA-SCNC: 19 MMOL/L (ref 21–25)
HCT VFR BLD AUTO: 40 % (ref 34–46)
HGB BLD-MCNC: 12.6 GM/DL (ref 11.4–16)
LDH SPEC-CCNC: 1473 U/L (ref 313–618)
LYMPHOCYTES # BLD MANUAL: 0.46 K/UL (ref 1–4.8)
MCH RBC QN AUTO: 30.5 PG (ref 25–35)
MCHC RBC AUTO-ENTMCNC: 31.3 G/DL (ref 31–37)
MCV RBC AUTO: 97.2 FL (ref 80–100)
METAMYELOCYTES # BLD: 0.06 K/UL
MONOCYTES # BLD MANUAL: 0.23 K/UL (ref 0–1)
NEUTROPHILS NFR BLD MANUAL: 82 %
NEUTS SEG # BLD MANUAL: 5.1 K/UL (ref 1.3–7.7)
PCO2 BLDA: 42 MMHG (ref 35–45)
PH BLDA: 7.26 [PH] (ref 7.35–7.45)
PLATELET # BLD AUTO: 102 K/UL (ref 150–450)
PO2 BLDA: 76 MMHG (ref 83–108)
POTASSIUM SERPL-SCNC: 4.9 MMOL/L (ref 3.5–5.1)
PROT SERPL-MCNC: 5 G/DL (ref 6.3–8.2)
SODIUM SERPL-SCNC: 138 MMOL/L (ref 137–145)
WBC # BLD AUTO: 5.8 K/UL (ref 3.8–10.6)

## 2021-03-09 PROCEDURE — 5A1955Z RESPIRATORY VENTILATION, GREATER THAN 96 CONSECUTIVE HOURS: ICD-10-PCS

## 2021-03-09 RX ADMIN — ALBUTEROL SULFATE SCH PUFF: 90 AEROSOL, METERED RESPIRATORY (INHALATION) at 07:29

## 2021-03-09 RX ADMIN — ALBUTEROL SULFATE SCH PUFF: 90 AEROSOL, METERED RESPIRATORY (INHALATION) at 19:06

## 2021-03-09 RX ADMIN — Medication SCH MG: at 21:23

## 2021-03-09 RX ADMIN — DEXTROSE SCH MG: 50 INJECTION, SOLUTION INTRAVENOUS at 21:16

## 2021-03-09 RX ADMIN — PIPERACILLIN AND TAZOBACTAM SCH MLS/HR: 3; .375 INJECTION, POWDER, FOR SOLUTION INTRAVENOUS at 10:14

## 2021-03-09 RX ADMIN — ALBUTEROL SULFATE SCH PUFF: 90 AEROSOL, METERED RESPIRATORY (INHALATION) at 15:44

## 2021-03-09 RX ADMIN — INSULIN ASPART SCH UNIT: 100 INJECTION, SOLUTION INTRAVENOUS; SUBCUTANEOUS at 06:25

## 2021-03-09 RX ADMIN — INSULIN ASPART SCH UNIT: 100 INJECTION, SOLUTION INTRAVENOUS; SUBCUTANEOUS at 00:31

## 2021-03-09 RX ADMIN — PIPERACILLIN AND TAZOBACTAM SCH MLS/HR: 3; .375 INJECTION, POWDER, FOR SOLUTION INTRAVENOUS at 17:50

## 2021-03-09 RX ADMIN — LEVOFLOXACIN SCH MLS/HR: 750 INJECTION, SOLUTION INTRAVENOUS at 08:31

## 2021-03-09 RX ADMIN — INSULIN ASPART SCH UNIT: 100 INJECTION, SOLUTION INTRAVENOUS; SUBCUTANEOUS at 11:52

## 2021-03-09 RX ADMIN — CEFAZOLIN SCH MLS/HR: 330 INJECTION, POWDER, FOR SOLUTION INTRAMUSCULAR; INTRAVENOUS at 09:27

## 2021-03-09 RX ADMIN — CEFAZOLIN SCH MLS/HR: 330 INJECTION, POWDER, FOR SOLUTION INTRAMUSCULAR; INTRAVENOUS at 21:23

## 2021-03-09 RX ADMIN — OXYCODONE HYDROCHLORIDE AND ACETAMINOPHEN SCH MG: 500 TABLET ORAL at 11:51

## 2021-03-09 RX ADMIN — PIPERACILLIN AND TAZOBACTAM SCH MLS/HR: 3; .375 INJECTION, POWDER, FOR SOLUTION INTRAVENOUS at 01:53

## 2021-03-09 RX ADMIN — Medication SCH MG: at 11:52

## 2021-03-09 RX ADMIN — CHLORHEXIDINE GLUCONATE SCH ML: 1.2 RINSE ORAL at 08:30

## 2021-03-09 RX ADMIN — ALBUTEROL SULFATE SCH PUFF: 90 AEROSOL, METERED RESPIRATORY (INHALATION) at 11:22

## 2021-03-09 RX ADMIN — CEFAZOLIN SCH: 330 INJECTION, POWDER, FOR SOLUTION INTRAMUSCULAR; INTRAVENOUS at 01:48

## 2021-03-09 RX ADMIN — ENOXAPARIN SODIUM SCH MG: 40 INJECTION SUBCUTANEOUS at 08:31

## 2021-03-09 RX ADMIN — Medication SCH MG: at 10:35

## 2021-03-09 RX ADMIN — PANTOPRAZOLE SODIUM SCH MG: 40 INJECTION, POWDER, FOR SOLUTION INTRAVENOUS at 11:08

## 2021-03-09 RX ADMIN — Medication SCH MCG: at 11:52

## 2021-03-09 RX ADMIN — Medication SCH MG: at 15:50

## 2021-03-09 RX ADMIN — CHLORHEXIDINE GLUCONATE SCH ML: 1.2 RINSE ORAL at 21:16

## 2021-03-09 RX ADMIN — INSULIN ASPART SCH UNIT: 100 INJECTION, SOLUTION INTRAVENOUS; SUBCUTANEOUS at 17:50

## 2021-03-09 RX ADMIN — DEXTROSE SCH MLS/HR: 50 INJECTION, SOLUTION INTRAVENOUS at 08:51

## 2021-03-09 RX ADMIN — CEFAZOLIN SCH MLS/HR: 330 INJECTION, POWDER, FOR SOLUTION INTRAMUSCULAR; INTRAVENOUS at 17:49

## 2021-03-09 NOTE — CDI
Documentation Clarification Form



Date: 03/09/2021 12:58:00 PM

From: Chantale Peraza RN, CCDS

Phone: [phone]

MRN: [id number]

Admit Date: 03/06/2021 01:38:00 AM

Patient Name: Treasure Wheeler

Visit Number: [id number]



ATTENTION: The Clinical Documentation Specialists (CDI) and [facility] Coding Staff 
appreciate your assistance in clarifying documentation. Please respond to the 
clarification below the line at the bottom and electronically sign. The CDI & 
[facility] Coding staff will review the response and follow-up if needed. Please note: 
Queries are made part of the Legal Health Record. If you have any questions, 
please contact the author of this message via ITS.



Dr. Robin Burgess



Altered Mental Status was documented in the 3/5 ED note and 3/6 Pulmonary 
consult and requires further specificity.



History/Risk Factors:

Acute Covid 19 pneumonia with acute hypoxic respiratory failure, Paroxysmal 
Atrial Fib, Acute lactic acidosis, hypotension requiring vasopressors, DM2, HTN



Clinical Indicators:

3/6 Pulmonary consult: "acute hypoxic respiratory failure secondary to: 90 
related pneumonia. The patient is currently on BiPAP at a pressure of 14/7 with 
an FiO2 of 100%. She remains quite hypoxic, not responding to BiPAP and she 
would obviously need intubation mechanical ventilation. The patient had rapid 
decline in her overall condition over the past 6 days, worse over the past 48 
hours. Chest x-ray showing diffuse bilaterally pulmonary infiltrates consistent 
with mobility related pneumonia. 2 acute shortness of breath secondary to above.
3 altered mental status secondary to above." 

3/6 H&P-3/8 Attending progress notes: "chief complaint of worsening shortness of
breath generalized weakness and confusion, symptoms started about 6 days prior 
to admission but her condition declined significantly in the last 2 days."

3/5-3/9 Labs: Coronavirus PCR Detected, Lactic acid 11.4/6.7/4.8/4.6, D-Dimer: 
1.09/1.77/6.82, K+ 4.5/5.5/4.9, /166/71, ALT 62/46/38, LDH 
1724/2069/1814/1473, /186, CRP 77/148.2/76.3/47.3

3/9 CXR: "Findings are similar to prior exam. Correlate for pneumonia, pulmonary
edema, ARDS."  



Treatment: 

Dexmedetomidine Gtt for Sedation

3/7 IV Levaquin 750 mg IVPB x 1 dose

MS 4 mg IVP Q2 hrs. PRN Pain

3/6 0849- current: Levophed Gtt titrate for B/P

3/5 0.9% NS IVF bolus 1L followed by 130 cc/hr. 

3/6 0.9% NS IVF bolus 3L followed by 130 cc/hr. 

3/9 0.9% NS IVF bolus 1L followed by 130 cc/hr.





In your professional opinion, please clarify the etiology of the Altered Mental 
Status, if known.



Metabolic Encephalopathy (specify Underlying Medical Illness) ___________

Toxic Encephalopathy (specify Underlying Medical Illness) ___________

Other condition (please specify) _________________

Unable to determine



(Last Revision: April 2018)

___________________________________________________________________________

metabolic encephalopathy secondary to acute hypoxic respiratory failure 
secondary to Covid 19 infection

MTDD

## 2021-03-09 NOTE — P.PN
Subjective


Progress Note Date: 03/09/21








Treasure Wheeler, is a 81-year-old female patient of Dr. Falcon, who 

presented to [facility] emergency room with a chief complaint 

of worsening shortness of breath generalized weakness and confusion, symptoms 

started about 6 days prior to admission but her condition declined significantly

in the last 2 days.  Patient was evaluated in emergency room vital examination 

on presentation revealed a temperature of 98.4 pulse 133 respiration 30 blood 

pressure 135/67 pulse ox 55% on room air her white blood count was 3.8 

hemoglobin 15.9 platelet count 99 sodium 133 potassium 4.5 plasma lactic acid 

11.4 BUN 17 creatinine 0.9 arterial blood gases revealed a pH of 7.19 pCO2 49 by

mouth to 59 .  Chest x-ray done in the emergency room revealed moderate 

pulmonary edema that could relate to congestive heart failure or ARDS , EKG 

revealed sinus tachycardia with right bundle branch block , coronavirus PCR was 

positive.


patient was admitted to intensive care unit she was started on BiPAP.  Patient 

developed atrial fibrillation with rapid ventricular response with a heart rate 

of 170 she was started on Cardizem drip, she had episodes of hypotension, she 

was started on levophed drip, and earlier this morning she required intubation 

and mechanical ventilation.


Her past medical history is significant for history of hypertension, history of 

hyperlipidemia, history of insulin-dependent diabetes mellitus, and history of 

osteoporosis





On 03/07/2021 patient was seen and examined in the ICU she is intubated sedated 

maintained on mechanical ventilation she is on assist control rate of 30 FiO2 

60% and a PEEP of 15.  PH is 7.18 pCO2 46 by mouth 02/01/2008 lactic acid is 

down to 4.6 blood pressure 100/52 she is still maintained on levophed infusion, 

she is maintained on IV Decadron she received 1 unit of convalescent plasma, 

white blood count 24.6 hemoglobin 14.1 platelet count 181 sodium 136 potassium 

5.6 chloride 110 CO2 17 BUN 28 creatinine 1.2 glucose level 165





On 03/08/2021 patient was seen and examined in the ICU she is intubated sedated 

maintained on mechanical ventilation, today she was taken off of vasopressors 

and has been tolerating well blood pressure at this time 98/55, pH is 7.30 pCO2 

37 by mouth to 72 white blood count is down to 6.6 hemoglobin 12.6 platelet cou

nt 107 d-dimer is 1.77 BUN 34 creatinine 0.96





On 03/09/2021 patient was seen and examined in the ICU she remains intubated 

sedated maintained on mechanical ventilation FiO2 55% rate of 24 and PEEP of 15 

temperature is 97.5 pulse 79 respiration 24 blood pressure 127/58 pulse ox 91% 

on FiO2 55% mechanical ventilation d-dimer 6.8 to pH 7.26 pCO2 42 by mouth to 76

white blood count is 5.8 hemoglobin 12.6 platelet count 102 BUN 37 creatinine 

0.96 liver enzymes are elevated with AST at 71 a LT at 38





Objective





- Vital Signs


Vital signs: 


                                   Vital Signs











Temp  97.5 F L  03/09/21 16:00


 


Pulse  72   03/09/21 19:00


 


Resp  22   03/09/21 19:00


 


BP  127/58   03/09/21 16:00


 


Pulse Ox  92 L  03/09/21 19:00








                                 Intake & Output











 03/09/21 03/09/21 03/10/21





 06:59 18:59 06:59


 


Intake Total 2354.405 3280.690 174


 


Output Total 810 705 40


 


Balance 6773.182 1266.690 134


 


Weight 86 kg  


 


Intake:   


 


  IV 1752 1802.0 146


 


    0.9 Sodium Chloride 120 120 10


 


    Dexamethasone Sod  50 





    Phosphate 20 mg In   





    Dextrose 5% in Water 50   





    ml @ 100 mls/hr IV DAILY   





    [facility] Rx#:[id number]   


 


    Levofloxacin 750Mg-D5w  150 





    Pmx 750 mg In Dextrose/   





    Water 1 150ml.bag @ 100   





    mls/hr IVPB Q48H [facility] Rx#:   





    [id number]   


 


    Piperacillin-Tazobactam 3  50.0 





    .375 gm In Sodium   





    Chloride 0.9% 100 ml @ 25   





    mls/hr IVPB Q8H [facility] Rx#:   





    [id number]   


 


    Pressure Bag 72 72 6


 


    Sodium Chloride 0.9% 1, 1560 1360 130





    000 ml @ 130 mls/hr IV .   





    Q7H42M ENRIQUE Rx#:[id number]   


 


  Intake, IV Titration 334.457 0153.690 





  Amount   


 


    Norepinephrine 8 mg In 72.807 51.791 





    Sodium Chloride 0.9% 250   





    ml @ 0.05 MCG/KG/MIN 7.   





    392 mls/hr IV .Q24H ENRIQUE   





    Rx#:[id number]   


 


    Piperacillin-Tazobactam 3 100  





    .375 gm In Sodium   





    Chloride 0.9% 100 ml @ 25   





    mls/hr IVPB Q8H [facility] Rx#:   





    [id number]   


 


    Sodium Chloride 0.9% 1,  1000 





    000 ml @ 999 mls/hr IV .   





    Q1H1M [facility] Rx#:[id number]   


 


    propofoL 1,000 mg In 3.598 0.899 





    Empty Bag 1 bag @ Titrate   





    IV .Q0M [facility] Rx#:   





    [id number]   


 


  Tube Feeding 336 336 28


 


  Other 90 90 


 


Output:   


 


  Urine 810 705 40


 


Other:   


 


  Voiding Method Indwelling Catheter Indwelling Catheter 








                       ABP, PAP, CO, CI - Last Documented











Arterial Blood Pressure        112/45

















- Exam








Patient is intubated sedated maintained on mechanical ventilation


HEENT head normocephalic and atraumatic


Neck is supple no JVD no goiter no lymphadenopathy


Chest exam reveals a few scattered rhonchi no wheezing


Cardiac exam reveals regular heart sounds no gallops no murmurs


Abdomen is soft nontender no organomegaly with normal bowel sounds


Extremity exam reveals no edema no cyanosis or clubbing








- Labs


CBC & Chem 7: 


                                 03/09/21 04:05





                                 03/09/21 04:05


Labs: 


                  Abnormal Lab Results - Last 24 Hours (Table)











  03/09/21 03/09/21 03/09/21 Range/Units





  00:04 04:05 04:05 


 


Plt Count     (150-450)  k/uL


 


Lymphocytes # (Manual)     (1.0-4.8)  k/uL


 


Metamyelocytes # (Man)     (0)  k/uL


 


D-Dimer   6.82 H   (<0.60)  mg/L FEU


 


ABG pH     (7.35-7.45)  


 


ABG pO2     ()  mmHg


 


ABG HCO3     (21-25)  mmol/L


 


Chloride    118 H  ()  mmol/L


 


Carbon Dioxide    19 L  (22-30)  mmol/L


 


BUN    37 H  (7-17)  mg/dL


 


Glucose    211 H  (74-99)  mg/dL


 


POC Glucose (mg/dL)  167 H    (75-99)  mg/dL


 


Calcium    7.7 L  (8.4-10.2)  mg/dL


 


AST    71 H  (14-36)  U/L


 


ALT    38 H  (4-34)  U/L


 


Lactate Dehydrogenase    1473 H  (313-618)  U/L


 


Creatine Kinase    186 H  ()  U/L


 


C-Reactive Protein    47.3 H  (<10.0)  mg/L


 


Total Protein    5.0 L  (6.3-8.2)  g/dL


 


Albumin    2.0 L  (3.5-5.0)  g/dL














  03/09/21 03/09/21 03/09/21 Range/Units





  04:05 05:30 06:21 


 


Plt Count  102 L    (150-450)  k/uL


 


Lymphocytes # (Manual)  0.46 L    (1.0-4.8)  k/uL


 


Metamyelocytes # (Man)  0.06 H    (0)  k/uL


 


D-Dimer     (<0.60)  mg/L FEU


 


ABG pH   7.26 L   (7.35-7.45)  


 


ABG pO2   76 L   ()  mmHg


 


ABG HCO3   19 L   (21-25)  mmol/L


 


Chloride     ()  mmol/L


 


Carbon Dioxide     (22-30)  mmol/L


 


BUN     (7-17)  mg/dL


 


Glucose     (74-99)  mg/dL


 


POC Glucose (mg/dL)    202 H  (75-99)  mg/dL


 


Calcium     (8.4-10.2)  mg/dL


 


AST     (14-36)  U/L


 


ALT     (4-34)  U/L


 


Lactate Dehydrogenase     (313-618)  U/L


 


Creatine Kinase     ()  U/L


 


C-Reactive Protein     (<10.0)  mg/L


 


Total Protein     (6.3-8.2)  g/dL


 


Albumin     (3.5-5.0)  g/dL














  03/09/21 03/09/21 Range/Units





  11:19 17:36 


 


Plt Count    (150-450)  k/uL


 


Lymphocytes # (Manual)    (1.0-4.8)  k/uL


 


Metamyelocytes # (Man)    (0)  k/uL


 


D-Dimer    (<0.60)  mg/L FEU


 


ABG pH    (7.35-7.45)  


 


ABG pO2    ()  mmHg


 


ABG HCO3    (21-25)  mmol/L


 


Chloride    ()  mmol/L


 


Carbon Dioxide    (22-30)  mmol/L


 


BUN    (7-17)  mg/dL


 


Glucose    (74-99)  mg/dL


 


POC Glucose (mg/dL)  204 H  198 H  (75-99)  mg/dL


 


Calcium    (8.4-10.2)  mg/dL


 


AST    (14-36)  U/L


 


ALT    (4-34)  U/L


 


Lactate Dehydrogenase    (313-618)  U/L


 


Creatine Kinase    ()  U/L


 


C-Reactive Protein    (<10.0)  mg/L


 


Total Protein    (6.3-8.2)  g/dL


 


Albumin    (3.5-5.0)  g/dL








                      Microbiology - Last 24 Hours (Table)











 03/06/21 16:43 Blood Culture - Preliminary





 Blood    No Growth after 72 hours


 


 03/06/21 17:18 Gram Stain - Final





 Sputum Sputum Culture - Final














Assessment and Plan


Plan: 





1. Acute hypoxic respiratory failure, requiring intubation and mechanical 

ventilation





2. Poitive Covid 19 testing





3. bilateral pulmonary infiltrates, likely related to pneumonia due to Covid 19 

virus





4.  Episode of atrial fibrillation with rapid ventricular response





5. Acute lactic acidosis improving





6. Episode of hypotension improved with IV fluid and levophed drip





7. Underlying history of hypertension





8. Underlying history of hyperlipidemia





9. Underlying history of diabetes mellitus





Currently patient is intubated sedated maintained on mechanical ventilation


She is maintained on IV Decadron and subcu Lovenox


Will follow closely prognosis is guarded due to age and severity of illness

## 2021-03-09 NOTE — CDI
Documentation Clarification Form



Date: 03/09/2021 12:53:45 PM

From: Chantale Peraza RN, CCDS

Phone: [phone]

MRN: [id number]

Admit Date: 03/06/2021 01:38:00 AM

Patient Name: Treasure Wheeler

Visit Number: [id number]



ATTENTION: The Clinical Documentation Specialists (CDI) and [facility] Coding Staff 
appreciate your assistance in clarifying documentation. Please respond to the 
clarification below the line at the bottom and electronically sign. The CDI & 
[facility] Coding staff will review the response and follow-up if needed. Please note: 
Queries are made part of the Legal Health Record. If you have any questions, 
please contact the author of this message via ITS.



Dr. Rashad Douglass



Hypotension is documented in a patient with known Covid infection, maintained on
vasopressors. Please provide clinical significance to help accurately reflect 
the patient's SOI/ROM.



Patient history/risk factors:

Paroxysmal Atrial fib with RVR, HTN, grade 2 diastolic heart failure, Acute 
Covid 19 pneumonia with acute hypoxic respiratory failure



Clinical Indicators:  

3/6 H&P-3/8 Attending Progress notes: "Patient developed atrial fibrillation 
with rapid ventricular response with a heart rate of 170 she was started on 
Cardizem drip, she had episodes of hypotension, she was started on Levophed 
drip, and earlier this morning she required intubation and mechanical 
ventilation. Plan: Episode of hypotension improved with IV fluid and Levophed 
drip."

3/7 Pulmonary Progress note: "2 hypotension, resuscitated IV fluids, received a 
total of 3 L and currently normal saline running at 130s's an hour. Consider 
underlying septic event or sepsis induced hypotension."

3/8 Pulmonary Progress note: Hypovolemic hypotension, responded well to IV 
fluids. Required pressors for a brief period of time."

3/6 0800 Vitals: temp 98.3, HR 76, RR 27, B/P 87/39, Spo2 88% on 100% FIO2 Bipap



Treatment:

3/6 0849- current: Levophed Gtt titrate for B/P

3/5 0.9% NS IVF bolus 1L followed by 130 cc/hr. 

3/6 0.9% NS IVF bolus 3L followed by 130 cc/hr. 

3/9 0.9% NS IVF bolus 1L followed by 130 cc/hr.

In your professional opinion, can you please further specify the hypotension and
cause if known? 



Septic Shock

        Suspected or known causative organism_________

        Any associated organ failure___________________

Cardiogenic Shock

         Cause______________

Hypovolemic Shock

         Cause______________          

Other, please specify_________

Unable to determine



(Last Revision: October 2017)

___________________________________________________________________________

MTDD

## 2021-03-09 NOTE — XR
EXAMINATION TYPE: XR chest 1V portable

 

DATE OF EXAM: 3/9/2021

 

COMPARISON: Chest x-ray 3/8/2021

 

HISTORY: Intubated

 

TECHNIQUE: Single frontal view of the chest is obtained.

 

FINDINGS:  Endotracheal tube, NG tube, left jugular central venous catheter are overlying appropriate
 positions, there are overlying leads. Bilateral airspace disease, interstitial prominence is present
, vague basilar density persists. No evident pneumothorax. Heart and mediastinal silhouette is likely
 stable. Aorta is dense.

 

IMPRESSION:  Findings are similar to prior exam. Correlate for pneumonia, pulmonary edema, ARDS

## 2021-03-09 NOTE — P.PN
Subjective


Progress Note Date: 03/09/21


Principal diagnosis: 





Acute hypoxic respiratory failure secondary to Covid 19 pneumonitis.








this 81-year-old female patient, known history of diabetes and hypertension, was

brought into the emergency department upon the request of her family because of 

generalized weakness, falls and confusion x6 days.  Her condition declined and 

the patient decompensated over the past 48-72 hours.  This was rather a quick 

deterioration in her condition.  She had been having also shortness of breath 

for the past 2 days..  In the emergency department, the initial vitals showed 

that the patient was profoundly hypoxic with a pulse ox of 50%.  At that point, 

the patient also had blood work that showed severe lactic acidosis with a lactic

acid level of 11.  She wasgiven a chest x-ray that showed diffuse bilateral 

pulmonary infiltrates.  The patient was given a bolus of 1 L of IV fluid and 

following that she was moved to the intensive care unit.  The patient was kept 

on BiPAP throughout the night and currently she is on a BiPAP pressure of 14/7 

cm of water with an FiO2 of 100%.  Her FiO2 was 85%.  She is responsive, however

she is quite tachypneic and she is breathing in the mid 30s and she is 

struggling with her breathing and earlier this morning she started getting more 

restless and agitated.  We had to start on Precedex which is currently running 

at 0.4 mcg/kg/h.  She seems to be much more comfortable while on Precedex.had a 

bout of atrial fibrillation with rapid ventricular response in the ICU.  The 

heart rate went up to 170 range.  She was given 20 mg of Cardizem IV push.  She 

converted back to normal sinus rhythm.  She became hypotensive and her systolic 

blood pressure in the mid 60s.  She was given additional 2 L of IV fluids and 

the patient is currently on normal saline running at 30 mL an hour.  She will be

also started on norepinephrine infusion.  Lactic acid level is down to 6 based 

on the follow-up blood work.the blood gas showed a pH of 7.29 with a pCO2 of 37 

and pO2 of 80.  This was done a bipolar or 14/7 with an FiO2 of 100%.  Chest x-

ray showing diffuse breath and pulmonary infiltrates.  D-dimer is at 1.09.  The 

patient has a LDH of 1724, CRP level is 77, troponin is at 0.118, the proBNP 

level is 330, the patient has a lactic acid level of 11.4 at time of admission 

her lactic acid level is down to 6.7 and she has an anion gap metabolic acidosis

with a serum bicarb of 14 and a gap of 19.  UA is positive for glucose, +4, and 

COVID19 testing was positive.  As such, her presentation is consistent with 

COVID 19 pneumonia.








03/07/2021, the patient remains intubated on a mechanical ventilator.  The 

patient was intubated yesterday for an acute hypoxic respiratory failure and she

was placed on mechanical ventilator she is currently off of a running at 40 

mcg/kg per minute.  The patient is on no paralytics.  She is quite successful 

mechanical ventilator.  She is an assist-control mode at the rate of 30 with a 

tidal volume of 375 and FiO2 of 60% with a PEEP of this.  The chest x-ray from 

today is showing bilateral perihilar and lower lobe in addition to right upper 

lobe pulmonary rate.  ET tube is in a good location.  It is seen around 2 cm 

above the dasia.  The patient also has a left internal jugular.  Some today 

showed a pH of 7.18 with a pCO2 of 46 and pO2 of 108 and this wasn't an FiO2 of 

60%.  Meanwhile, the patient was quite hypotensive and acidotic and the patient 

had a very high lactic acid level of 11.4 which dropped down to 4.6 with fluid 

resuscitation.  Overall, the patient received a total of 3 L of IV fluid in the 

form of normal saline and the patient is currently running at 130s's an hour of 

normal saline.  Urine output is in order of 30-40 mL an hour and the net fluid 

balance is been +4.8 L over the past 24 hours.  As such the patient is well 

resuscitated.  Most recent blood pressure is 95/44 and the patient is currently 

on pressors at 19 mcg/ per minute of norepinephrine infusion.  The procalcitonin

level was at 0.53 and blood cultures and sputum cultures were sent and the 

patient was covered with empiric antibiotics and the patient was given cefepime 

and Levaquin.  White cell count today's at 24.  She was given Decadron.  She was

given convalescent plasma one unit..  The renal function is stable.  E

lectrolytes are stable.  Potassium level is at 5.6 and this needs to be 

repeated.  Serum bicarb is at 17 which is improving.  Less lactic acid level 

from yesterday was at 4.6.  The patient's blood sugar is at 160 and she is on 

sliding scale coverage for blood sugar control.  Cardiac rhythm is sinus.  No 

further bouts of atrial fibrillation.  She did have one a 1 bouts yesterday 

prior to intubation and she converted back into normal sinus rhythm without any 

major difficulties.





Patient was reevaluated today on 3/8/2021, remains in the ICU, intubated and 

mechanically ventilated.  Patient is on volume control plus, tidal volume is 

450, rate is 24, FiO2 is 60%, PEEP is at 15.  ABG showed a pO2 of 72 pCO2 of 37 

pH of 7.30.  Patient is on propofol at 30 mcg/kg/m, off norepinephrine, IV fluid

is at 13 0 mL per hour.  Patient is on enteral feeding patient received 1 unit 

of convalescent plasma she was out of the window for remdesivir, she is on 

Decadron 20 mg daily, and she is also on Lovenox 40 mg subcu daily.  Today, 

after reviewing her ABG and reviewed her chest x-ray, I felt there is no need to

make any major changes in her ventilator settings.  Hence no changes were made, 

and we will plan to continue present supportive care measures.  Patient remains 

empirically on antibiotics.  Her bicarb is improving not requiring any bicarb 

drip.  She remains on sliding scale coverage for blood sugar.  Presently in 

sinus rhythm, no further episodes of atrial fibrillation.  Chest x-ray continues

to show bilateral multifocal infiltrates with cardiomegaly.  CBC is relatively 

normal.  D-dimer is 1.77.  Basic metabolic profile is normal.  Renal profile is 

normal.  LDH is 1814 and C-reactive protein is 76.3.  Pro-calcitonin is elevated

at 0.74.  Levaquin and Zosyn and remain on board.





Reevaluated today on 3/9/2021, patient remains in the ICU, intubated and 

mechanically ventilated.  She is on assist control rate of 24 tidal volume is 

450 FiO2 is 55% PEEP is 15.  ABG showed a pO2 of 76 pCO2 of 42 pH of 7.26.  Has 

patient was placed on oral bicarb.  Patient is on propofol at 56 mcg/kg/m, 

norepinephrine at 0.03 mcg/kg/m, she is on tube feeding, patient will receive 1 

L of fluid bolus in the formal 0.9 normal saline, and hopefully wean 

norepinephrine down and possibly discontinue.  Chest x-ray continues to show 

bilateral infiltrates.  And again her ABG is marginal on 60% FiO2 her pO2 was 

only 76.  FiO2 was decreased down to 55%.  No major change over the last 24 

hours, patient is basically about the same.  CBC is relatively normal.  D-dimer 

6.82.  Basic metabolic profile is normal.  Renal profile is normal.  LDH is 1473

and C-reactive protein is 47.3.  Patient remains on the Covid 19 cocktails.  

Remains on Decadron 20 mg IV push daily, remains on Zosyn and Levaquin.  Sputum 

cultures are negative.


Hence we will discontinue Levaquin and continue Zosyn





Objective





- Vital Signs


Vital signs: 


                                   Vital Signs











Temp  97.6 F   03/09/21 12:00


 


Pulse  89   03/09/21 13:00


 


Resp  24   03/09/21 13:00


 


BP  121/57   03/09/21 12:00


 


Pulse Ox  91 L  03/09/21 13:00








                                 Intake & Output











 03/08/21 03/09/21 03/09/21





 18:59 06:59 18:59


 


Intake Total 2007.875 2354.405 2541.111


 


Output Total 815 810 520


 


Balance 1724.981 9037.405 2021.111


 


Weight 88.1 kg 86 kg 


 


Intake:   


 


  IV 1726 1752 1218.0


 


    0.9 Sodium Chloride 90 120 80


 


    Dexamethasone Sod 50  50





    Phosphate 20 mg In   





    Dextrose 5% in Water 50   





    ml @ 100 mls/hr IV DAILY   





    [facility] Rx#:[id number]   


 


    Levofloxacin 750Mg-D5w   150





    Pmx 750 mg In Dextrose/   





    Water 1 150ml.bag @ 100   





    mls/hr IVPB Q48H [facility] Rx#:   





    [id number]   


 


    Piperacillin-Tazobactam 3 100  50.0





    .375 gm In Sodium   





    Chloride 0.9% 100 ml @ 25   





    mls/hr IVPB Q8H [facility] Rx#:   





    [id number]   


 


    Pressure Bag 66 72 48


 


    Sodium Chloride 0.9% 1, 1420 1560 840





    000 ml @ 130 mls/hr IV .   





    Q7H42M [facility] Rx#:[id number]   


 


  Intake, IV Titration 71.875 492.562 8017.111





  Amount   


 


    Norepinephrine 8 mg In 1.972 72.807 38.215





    Sodium Chloride 0.9% 250   





    ml @ 0.05 MCG/KG/MIN 7.   





    392 mls/hr IV .Q24H [facility]   





    Rx#:[id number]   


 


    Piperacillin-Tazobactam 3  100 





    .375 gm In Sodium   





    Chloride 0.9% 100 ml @ 25   





    mls/hr IVPB Q8H [facility] Rx#:   





    [id number]   


 


    Sodium Chloride 0.9% 1,   1000





    000 ml @ 999 mls/hr IV .   





    Q1H1M ONE Rx#:[id number]   


 


    propofoL 1,000 mg In 69.903 3.598 0.896





    Empty Bag 1 bag @ Titrate   





    IV .Q0M [facility] Rx#:   





    [id number]   


 


  Tube Feeding 120 336 224


 


  Other 90 90 60


 


Output:   


 


  Urine 815 810 520


 


Other:   


 


  Voiding Method Indwelling Catheter Indwelling Catheter Indwelling Catheter








                       ABP, PAP, CO, CI - Last Documented











Arterial Blood Pressure        107/45

















- Exam





Physical Exam: Revealed a 81-year-old female in no distress, ventilated, 

sedated, comfortable.


Head: Atraumatic, normocephalic


HEENT:[Neck is supple.] [No neck masses.] [No thyromegaly.] [No JVD.] endo 

tracheal tube and orogastric tubes are intact.


Chest: [Clear throughout, minimal crackles at the bases no rhonchi and no whe

ezes.


Cardiac Exam: [Normal S1 and S2, no S3 gallop, no murmur.]


Abdomen: [Soft, nontender,  no megaly, no rebound, no guarding, normal bowel 

sounds.]


Extremities: [No clubbing, no edema, no cyanosis.]


Neurological Exam: Could not assess, patient is sedated and maintained on 

propofol.  She is calm and comfortable, pupils are equally reactive to light.


Psychiatric: Could not be assessed.


Skin: No rashes.





- Labs


CBC & Chem 7: 


                                 03/09/21 04:05





                                 03/09/21 04:05


Labs: 


                  Abnormal Lab Results - Last 24 Hours (Table)











  03/08/21 03/09/21 03/09/21 Range/Units





  18:04 00:04 04:05 


 


Plt Count     (150-450)  k/uL


 


Lymphocytes # (Manual)     (1.0-4.8)  k/uL


 


Metamyelocytes # (Man)     (0)  k/uL


 


D-Dimer    6.82 H  (<0.60)  mg/L FEU


 


ABG pH     (7.35-7.45)  


 


ABG pO2     ()  mmHg


 


ABG HCO3     (21-25)  mmol/L


 


Chloride     ()  mmol/L


 


Carbon Dioxide     (22-30)  mmol/L


 


BUN     (7-17)  mg/dL


 


Glucose     (74-99)  mg/dL


 


POC Glucose (mg/dL)  155 H  167 H   (75-99)  mg/dL


 


Calcium     (8.4-10.2)  mg/dL


 


AST     (14-36)  U/L


 


ALT     (4-34)  U/L


 


Lactate Dehydrogenase     (313-618)  U/L


 


Creatine Kinase     ()  U/L


 


C-Reactive Protein     (<10.0)  mg/L


 


Total Protein     (6.3-8.2)  g/dL


 


Albumin     (3.5-5.0)  g/dL














  03/09/21 03/09/21 03/09/21 Range/Units





  04:05 04:05 05:30 


 


Plt Count   102 L   (150-450)  k/uL


 


Lymphocytes # (Manual)   0.46 L   (1.0-4.8)  k/uL


 


Metamyelocytes # (Man)   0.06 H   (0)  k/uL


 


D-Dimer     (<0.60)  mg/L FEU


 


ABG pH    7.26 L  (7.35-7.45)  


 


ABG pO2    76 L  ()  mmHg


 


ABG HCO3    19 L  (21-25)  mmol/L


 


Chloride  118 H    ()  mmol/L


 


Carbon Dioxide  19 L    (22-30)  mmol/L


 


BUN  37 H    (7-17)  mg/dL


 


Glucose  211 H    (74-99)  mg/dL


 


POC Glucose (mg/dL)     (75-99)  mg/dL


 


Calcium  7.7 L    (8.4-10.2)  mg/dL


 


AST  71 H    (14-36)  U/L


 


ALT  38 H    (4-34)  U/L


 


Lactate Dehydrogenase  1473 H    (313-618)  U/L


 


Creatine Kinase  186 H    ()  U/L


 


C-Reactive Protein  47.3 H    (<10.0)  mg/L


 


Total Protein  5.0 L    (6.3-8.2)  g/dL


 


Albumin  2.0 L    (3.5-5.0)  g/dL














  03/09/21 03/09/21 Range/Units





  06:21 11:19 


 


Plt Count    (150-450)  k/uL


 


Lymphocytes # (Manual)    (1.0-4.8)  k/uL


 


Metamyelocytes # (Man)    (0)  k/uL


 


D-Dimer    (<0.60)  mg/L FEU


 


ABG pH    (7.35-7.45)  


 


ABG pO2    ()  mmHg


 


ABG HCO3    (21-25)  mmol/L


 


Chloride    ()  mmol/L


 


Carbon Dioxide    (22-30)  mmol/L


 


BUN    (7-17)  mg/dL


 


Glucose    (74-99)  mg/dL


 


POC Glucose (mg/dL)  202 H  204 H  (75-99)  mg/dL


 


Calcium    (8.4-10.2)  mg/dL


 


AST    (14-36)  U/L


 


ALT    (4-34)  U/L


 


Lactate Dehydrogenase    (313-618)  U/L


 


Creatine Kinase    ()  U/L


 


C-Reactive Protein    (<10.0)  mg/L


 


Total Protein    (6.3-8.2)  g/dL


 


Albumin    (3.5-5.0)  g/dL








                      Microbiology - Last 24 Hours (Table)











 03/06/21 17:18 Gram Stain - Final





 Sputum Sputum Culture - Final


 


 03/06/21 16:43 Blood Culture - Preliminary





 Blood    No Growth after 48 hours














Assessment and Plan


Assessment: 





Impression:


Acute hypoxic respiratory failure secondary to covid 19 pneumonitis.


Lymphopenia with thrombocytopenia secondary to above.


Elevated inflammatory markers


Type 2 diabetes.


Benign essential hypertension.


Paroxysmal atrial fibrillation.


Hypovolemic hypotension,, remains on fluids and pressors, we will increase the 

amount of fluid given.





Recommendation:


Continue ventilatory support.  Decrease FiO2 to 50%.


Continue Covid 19 cocktail.


Continue Decadron.  Decrease the dose to 6 mg IV push every 12 hours.


Patient was not a candidate for remdesivir and anti-interleukin-6.


Patient received 2 units of convalescent plasma.


Continue nutritional support.


Continue GI and DVT prophylaxis.


Patient is critically ill, considering her ventilatory settings high PEEP and 

high FiO2, not ready for any form of weaning.


Critical care time is 35 minutes.  We'll continue to follow.








Time with Patient: Greater than 30

## 2021-03-10 LAB
ALBUMIN SERPL-MCNC: 2 G/DL (ref 3.5–5)
ALP SERPL-CCNC: 84 U/L (ref 38–126)
ALT SERPL-CCNC: 39 U/L (ref 4–34)
ANION GAP SERPL CALC-SCNC: 1 MMOL/L
AST SERPL-CCNC: 57 U/L (ref 14–36)
BUN SERPL-SCNC: 33 MG/DL (ref 7–17)
CALCIUM SPEC-MCNC: 8 MG/DL (ref 8.4–10.2)
CELLS COUNTED: 200
CHLORIDE SERPL-SCNC: 120 MMOL/L (ref 98–107)
CK SERPL-CCNC: 94 U/L (ref 30–135)
CO2 BLDA-SCNC: 20 MMOL/L (ref 19–24)
CO2 SERPL-SCNC: 20 MMOL/L (ref 22–30)
ERYTHROCYTE [DISTWIDTH] IN BLOOD BY AUTOMATED COUNT: 4.19 M/UL (ref 3.8–5.4)
ERYTHROCYTE [DISTWIDTH] IN BLOOD: 14.8 % (ref 11.5–15.5)
FERRITIN SERPL-MCNC: 250 NG/ML (ref 10–291)
GLUCOSE BLD-MCNC: 155 MG/DL (ref 75–99)
GLUCOSE BLD-MCNC: 172 MG/DL (ref 75–99)
GLUCOSE BLD-MCNC: 178 MG/DL (ref 75–99)
GLUCOSE BLD-MCNC: 230 MG/DL (ref 75–99)
GLUCOSE SERPL-MCNC: 209 MG/DL (ref 74–99)
HCO3 BLDA-SCNC: 19 MMOL/L (ref 21–25)
HCT VFR BLD AUTO: 41.7 % (ref 34–46)
HGB BLD-MCNC: 12.8 GM/DL (ref 11.4–16)
LDH SPEC-CCNC: 1476 U/L (ref 313–618)
LYMPHOCYTES # BLD MANUAL: 0.62 K/UL (ref 1–4.8)
MCH RBC QN AUTO: 30.4 PG (ref 25–35)
MCHC RBC AUTO-ENTMCNC: 30.6 G/DL (ref 31–37)
MCV RBC AUTO: 99.4 FL (ref 80–100)
METAMYELOCYTES # BLD: 0.14 K/UL
MONOCYTES # BLD MANUAL: 0.62 K/UL (ref 0–1)
NEUTROPHILS NFR BLD MANUAL: 74 %
NEUTS SEG # BLD MANUAL: 5.5 K/UL (ref 1.3–7.7)
PCO2 BLDA: 42 MMHG (ref 35–45)
PH BLDA: 7.27 [PH] (ref 7.35–7.45)
PLATELET # BLD AUTO: 71 K/UL (ref 150–450)
PO2 BLDA: 69 MMHG (ref 83–108)
POTASSIUM SERPL-SCNC: 4.7 MMOL/L (ref 3.5–5.1)
PROT SERPL-MCNC: 5.2 G/DL (ref 6.3–8.2)
SODIUM SERPL-SCNC: 141 MMOL/L (ref 137–145)
WBC # BLD AUTO: 6.9 K/UL (ref 3.8–10.6)

## 2021-03-10 RX ADMIN — PIPERACILLIN AND TAZOBACTAM SCH MLS/HR: 3; .375 INJECTION, POWDER, FOR SOLUTION INTRAVENOUS at 01:18

## 2021-03-10 RX ADMIN — ALBUTEROL SULFATE SCH PUFF: 90 AEROSOL, METERED RESPIRATORY (INHALATION) at 21:39

## 2021-03-10 RX ADMIN — OXYCODONE HYDROCHLORIDE AND ACETAMINOPHEN SCH MG: 500 TABLET ORAL at 08:28

## 2021-03-10 RX ADMIN — ALBUTEROL SULFATE SCH PUFF: 90 AEROSOL, METERED RESPIRATORY (INHALATION) at 17:06

## 2021-03-10 RX ADMIN — INSULIN ASPART SCH UNIT: 100 INJECTION, SOLUTION INTRAVENOUS; SUBCUTANEOUS at 12:12

## 2021-03-10 RX ADMIN — PIPERACILLIN AND TAZOBACTAM SCH MLS/HR: 3; .375 INJECTION, POWDER, FOR SOLUTION INTRAVENOUS at 18:15

## 2021-03-10 RX ADMIN — Medication SCH MG: at 08:28

## 2021-03-10 RX ADMIN — ENOXAPARIN SODIUM SCH MG: 40 INJECTION SUBCUTANEOUS at 08:29

## 2021-03-10 RX ADMIN — CEFAZOLIN SCH MLS/HR: 330 INJECTION, POWDER, FOR SOLUTION INTRAMUSCULAR; INTRAVENOUS at 06:57

## 2021-03-10 RX ADMIN — PIPERACILLIN AND TAZOBACTAM SCH MLS/HR: 3; .375 INJECTION, POWDER, FOR SOLUTION INTRAVENOUS at 09:51

## 2021-03-10 RX ADMIN — Medication SCH MG: at 16:21

## 2021-03-10 RX ADMIN — INSULIN ASPART SCH UNIT: 100 INJECTION, SOLUTION INTRAVENOUS; SUBCUTANEOUS at 06:56

## 2021-03-10 RX ADMIN — ENOXAPARIN SODIUM SCH MG: 60 INJECTION SUBCUTANEOUS at 21:05

## 2021-03-10 RX ADMIN — ALBUTEROL SULFATE SCH PUFF: 90 AEROSOL, METERED RESPIRATORY (INHALATION) at 08:59

## 2021-03-10 RX ADMIN — CHLORHEXIDINE GLUCONATE SCH ML: 1.2 RINSE ORAL at 08:28

## 2021-03-10 RX ADMIN — CHLORHEXIDINE GLUCONATE SCH ML: 1.2 RINSE ORAL at 21:03

## 2021-03-10 RX ADMIN — Medication SCH MG: at 08:32

## 2021-03-10 RX ADMIN — ALBUTEROL SULFATE SCH PUFF: 90 AEROSOL, METERED RESPIRATORY (INHALATION) at 12:06

## 2021-03-10 RX ADMIN — NOREPINEPHRINE BITARTRATE SCH: 1 INJECTION, SOLUTION, CONCENTRATE INTRAVENOUS at 19:06

## 2021-03-10 RX ADMIN — Medication SCH MG: at 21:04

## 2021-03-10 RX ADMIN — DEXTROSE SCH MG: 50 INJECTION, SOLUTION INTRAVENOUS at 21:03

## 2021-03-10 RX ADMIN — NOREPINEPHRINE BITARTRATE SCH MLS/HR: 1 INJECTION, SOLUTION, CONCENTRATE INTRAVENOUS at 00:24

## 2021-03-10 RX ADMIN — CEFAZOLIN SCH MLS/HR: 330 INJECTION, POWDER, FOR SOLUTION INTRAMUSCULAR; INTRAVENOUS at 16:51

## 2021-03-10 RX ADMIN — Medication SCH MCG: at 08:28

## 2021-03-10 RX ADMIN — PANTOPRAZOLE SODIUM SCH MG: 40 INJECTION, POWDER, FOR SOLUTION INTRAVENOUS at 08:29

## 2021-03-10 RX ADMIN — INSULIN ASPART SCH UNIT: 100 INJECTION, SOLUTION INTRAVENOUS; SUBCUTANEOUS at 17:57

## 2021-03-10 RX ADMIN — INSULIN ASPART SCH UNIT: 100 INJECTION, SOLUTION INTRAVENOUS; SUBCUTANEOUS at 00:20

## 2021-03-10 RX ADMIN — DEXTROSE SCH MG: 50 INJECTION, SOLUTION INTRAVENOUS at 08:29

## 2021-03-10 NOTE — XR
EXAMINATION TYPE: XR chest 1V portable

 

DATE OF EXAM: 3/10/2021

 

CLINICAL HISTORY: Difficulty breathing progress study.  

 

TECHNIQUE: Single AP portable semiupright view of the chest is obtained.

 

COMPARISON: Chest x-ray from one day earlier and older studies.

 

FINDINGS:  Stable endotracheal and orogastric tubes. Stable left internal jugular central venous cath
eter. 

 

Persistent cardiomegaly with atherosclerotic thoracic aorta. Background reticular interstitial change
s bilaterally with increased bilateral central and lower lung opacities. Tiny bilateral pleural effus
ions likely present. Underlying scoliotic curvature or positioning.

 

IMPRESSION: Bilateral multifocal predominantly mid to lower lung acute infiltrates and/or edema on ba
ckground cardiomegaly and chronic parenchymal changes redemonstrated. No significant change from one 
day earlier.

## 2021-03-10 NOTE — P.PN
Subjective


Progress Note Date: 03/10/21








Treasure Wheeler, is a 81-year-old female patient of Dr. Falcon, who 

presented to [facility] emergency room with a chief complaint 

of worsening shortness of breath generalized weakness and confusion, symptoms 

started about 6 days prior to admission but her condition declined significantly

in the last 2 days.  Patient was evaluated in emergency room vital examination 

on presentation revealed a temperature of 98.4 pulse 133 respiration 30 blood 

pressure 135/67 pulse ox 55% on room air her white blood count was 3.8 

hemoglobin 15.9 platelet count 99 sodium 133 potassium 4.5 plasma lactic acid 

11.4 BUN 17 creatinine 0.9 arterial blood gases revealed a pH of 7.19 pCO2 49 by

mouth to 59 .  Chest x-ray done in the emergency room revealed moderate 

pulmonary edema that could relate to congestive heart failure or ARDS , EKG 

revealed sinus tachycardia with right bundle branch block , coronavirus PCR was 

positive.


patient was admitted to intensive care unit she was started on BiPAP.  Patient 

developed atrial fibrillation with rapid ventricular response with a heart rate 

of 170 she was started on Cardizem drip, she had episodes of hypotension, she 

was started on levophed drip, and earlier this morning she required intubation 

and mechanical ventilation.


Her past medical history is significant for history of hypertension, history of 

hyperlipidemia, history of insulin-dependent diabetes mellitus, and history of 

osteoporosis





On 03/07/2021 patient was seen and examined in the ICU she is intubated sedated 

maintained on mechanical ventilation she is on assist control rate of 30 FiO2 

60% and a PEEP of 15.  PH is 7.18 pCO2 46 by mouth 02/01/2008 lactic acid is 

down to 4.6 blood pressure 100/52 she is still maintained on levophed infusion, 

she is maintained on IV Decadron she received 1 unit of convalescent plasma, 

white blood count 24.6 hemoglobin 14.1 platelet count 181 sodium 136 potassium 

5.6 chloride 110 CO2 17 BUN 28 creatinine 1.2 glucose level 165





On 03/08/2021 patient was seen and examined in the ICU she is intubated sedated 

maintained on mechanical ventilation, today she was taken off of vasopressors 

and has been tolerating well blood pressure at this time 98/55, pH is 7.30 pCO2 

37 by mouth to 72 white blood count is down to 6.6 hemoglobin 12.6 platelet cou

nt 107 d-dimer is 1.77 BUN 34 creatinine 0.96





On 03/09/2021 patient was seen and examined in the ICU she remains intubated 

sedated maintained on mechanical ventilation FiO2 55% rate of 24 and PEEP of 15 

temperature is 97.5 pulse 79 respiration 24 blood pressure 127/58 pulse ox 91% 

on FiO2 55% mechanical ventilation d-dimer 6.8 to pH 7.26 pCO2 42 by mouth to 76

white blood count is 5.8 hemoglobin 12.6 platelet count 102 BUN 37 creatinine 

0.96 liver enzymes are elevated with AST at 71 a LT at 38





On 03/10/2021 patient was seen and examined in the ICU she is intubated sedated 

maintained on mechanical ventilation FiO2 55% rate of 24 and PEEP of 15 her 

vital examination reveals a temperature of 97.5 pulse 75 respiration 24 blood 

pressure 103/46 pulse ox 91% arterial blood gas reveals a pH of 7.27 pCO2 42 pO2

69


White blood count is 6.9 hemoglobin 12.8 platelet count 71 sodium 141 potassium 

4.7 chloride 120 CO2 20 BUN 33 creatinine 0.73 patient is not on any IV pressors

at this time.





Objective





- Vital Signs


Vital signs: 


                                   Vital Signs











Temp  97.5 F L  03/10/21 12:00


 


Pulse  70   03/10/21 14:00


 


Resp  24   03/10/21 14:00


 


BP  103/46   03/10/21 12:00


 


Pulse Ox  90 L  03/10/21 14:00








                                 Intake & Output











 03/09/21 03/10/21 03/10/21





 18:59 06:59 18:59


 


Intake Total 3280.690 2371.064 1482.520


 


Output Total 705 665 260


 


Balance 2575.690 0804.883 4963.520


 


Weight  94.4 kg 94.4 kg


 


Intake:   


 


  IV 1802.0 1877 1168


 


    0.9 Sodium Chloride 120 120 80


 


    Dexamethasone Sod 50  





    Phosphate 20 mg In   





    Dextrose 5% in Water 50   





    ml @ 100 mls/hr IV DAILY   





    [facility] Rx#:[id number]   


 


    Levofloxacin 750Mg-D5w 150  





    Pmx 750 mg In Dextrose/   





    Water 1 150ml.bag @ 100   





    mls/hr IVPB Q48H [facility] Rx#:   





    [id number]   


 


    Piperacillin-Tazobactam 3 50.0 125 





    .375 gm In Sodium   





    Chloride 0.9% 100 ml @ 25   





    mls/hr IVPB Q8H [facility] Rx#:   





    [id number]   


 


    Pressure Bag 72 72 48


 


    Sodium Chloride 0.9% 1, 1360 1560 1040





    000 ml @ 130 mls/hr IV .   





    Q7H42M [facility] Rx#:[id number]   


 


  Intake, IV Titration 1052.690 266.064 58.520





  Amount   


 


    Norepinephrine 8 mg In 51.791 67.637 58.520





    Sodium Chloride 0.9% 250   





    ml @ 0.05 MCG/KG/MIN 7.   





    392 mls/hr IV .Q24H [facility]   





    Rx#:[id number]   


 


    Sodium Chloride 0.9% 1, 1000  





    000 ml @ 999 mls/hr IV .   





    Q1H1M [facility] Rx#:[id number]   


 


    propofoL 1,000 mg In 0.899 198.427 





    Empty Bag 1 bag @ Titrate   





    IV .Q0M [facility] Rx#:   





    [id number]   


 


  Tube Feeding 336 168 196


 


  Other 90 60 60


 


Output:   


 


  Urine 705 665 260


 


Other:   


 


  Voiding Method Indwelling Catheter Indwelling Catheter Indwelling Catheter








                       ABP, PAP, CO, CI - Last Documented











Arterial Blood Pressure        129/46

















- Exam








Patient is intubated sedated maintained on mechanical ventilation


HEENT head normocephalic and atraumatic


Neck is supple no JVD no goiter no lymphadenopathy


Chest exam reveals a few scattered rhonchi no wheezing


Cardiac exam reveals regular heart sounds no gallops no murmurs


Abdomen is soft nontender no organomegaly with normal bowel sounds


Extremity exam reveals no edema no cyanosis or clubbing








- Labs


CBC & Chem 7: 


                                 03/10/21 04:00





                                 03/10/21 04:00


Labs: 


                  Abnormal Lab Results - Last 24 Hours (Table)











  03/09/21 03/10/21 03/10/21 Range/Units





  17:36 00:16 04:00 


 


MCHC     (31.0-37.0)  g/dL


 


Plt Count     (150-450)  k/uL


 


Lymphocytes # (Manual)     (1.0-4.8)  k/uL


 


Metamyelocytes # (Man)     (0)  k/uL


 


D-Dimer    >35.20 H  (<0.60)  mg/L FEU


 


ABG pH     (7.35-7.45)  


 


ABG pO2     ()  mmHg


 


ABG HCO3     (21-25)  mmol/L


 


ABG O2 Saturation     (94-97)  %


 


Chloride     ()  mmol/L


 


Carbon Dioxide     (22-30)  mmol/L


 


BUN     (7-17)  mg/dL


 


Glucose     (74-99)  mg/dL


 


POC Glucose (mg/dL)  198 H  230 H   (75-99)  mg/dL


 


Calcium     (8.4-10.2)  mg/dL


 


AST     (14-36)  U/L


 


ALT     (4-34)  U/L


 


Lactate Dehydrogenase     (313-618)  U/L


 


C-Reactive Protein     (<10.0)  mg/L


 


Total Protein     (6.3-8.2)  g/dL


 


Albumin     (3.5-5.0)  g/dL














  03/10/21 03/10/21 03/10/21 Range/Units





  04:00 04:00 04:58 


 


MCHC   30.6 L   (31.0-37.0)  g/dL


 


Plt Count   71 L   (150-450)  k/uL


 


Lymphocytes # (Manual)   0.62 L   (1.0-4.8)  k/uL


 


Metamyelocytes # (Man)   0.14 H   (0)  k/uL


 


D-Dimer     (<0.60)  mg/L FEU


 


ABG pH    7.27 L  (7.35-7.45)  


 


ABG pO2    69 L  ()  mmHg


 


ABG HCO3    19 L  (21-25)  mmol/L


 


ABG O2 Saturation    93.8 L  (94-97)  %


 


Chloride  120 H    ()  mmol/L


 


Carbon Dioxide  20 L    (22-30)  mmol/L


 


BUN  33 H    (7-17)  mg/dL


 


Glucose  209 H    (74-99)  mg/dL


 


POC Glucose (mg/dL)     (75-99)  mg/dL


 


Calcium  8.0 L    (8.4-10.2)  mg/dL


 


AST  57 H    (14-36)  U/L


 


ALT  39 H    (4-34)  U/L


 


Lactate Dehydrogenase  1476 H    (313-618)  U/L


 


C-Reactive Protein  29.7 H    (<10.0)  mg/L


 


Total Protein  5.2 L    (6.3-8.2)  g/dL


 


Albumin  2.0 L    (3.5-5.0)  g/dL














  03/10/21 03/10/21 Range/Units





  06:49 12:04 


 


MCHC    (31.0-37.0)  g/dL


 


Plt Count    (150-450)  k/uL


 


Lymphocytes # (Manual)    (1.0-4.8)  k/uL


 


Metamyelocytes # (Man)    (0)  k/uL


 


D-Dimer    (<0.60)  mg/L FEU


 


ABG pH    (7.35-7.45)  


 


ABG pO2    ()  mmHg


 


ABG HCO3    (21-25)  mmol/L


 


ABG O2 Saturation    (94-97)  %


 


Chloride    ()  mmol/L


 


Carbon Dioxide    (22-30)  mmol/L


 


BUN    (7-17)  mg/dL


 


Glucose    (74-99)  mg/dL


 


POC Glucose (mg/dL)  155 H  172 H  (75-99)  mg/dL


 


Calcium    (8.4-10.2)  mg/dL


 


AST    (14-36)  U/L


 


ALT    (4-34)  U/L


 


Lactate Dehydrogenase    (313-618)  U/L


 


C-Reactive Protein    (<10.0)  mg/L


 


Total Protein    (6.3-8.2)  g/dL


 


Albumin    (3.5-5.0)  g/dL








                      Microbiology - Last 24 Hours (Table)











 03/06/21 16:43 Blood Culture - Preliminary





 Blood    No Growth after 72 hours














Assessment and Plan


Plan: 





1. Acute hypoxic respiratory failure, requiring intubation and mechanical 

ventilation





2. Poitive Covid 19 testing





3. bilateral pulmonary infiltrates, likely related to pneumonia due to Covid 19 

virus





4.  Episode of atrial fibrillation with rapid ventricular response





5. Acute lactic acidosis improving





6. Episode of hypotension improved with IV fluid and levophed drip





7. Underlying history of hypertension





8. Underlying history of hyperlipidemia





9. Underlying history of diabetes mellitus





Currently patient is intubated sedated maintained on mechanical ventilation


She is maintained on IV Decadron and subcu Lovenox


Will follow closely prognosis is guarded due to age and severity of illness

## 2021-03-10 NOTE — P.PN
Subjective


Progress Note Date: 03/10/21


Principal diagnosis: 





Acute hypoxic respiratory failure secondary to Covid 19 pneumonitis.








this 81-year-old female patient, known history of diabetes and hypertension, was

brought into the emergency department upon the request of her family because of 

generalized weakness, falls and confusion x6 days.  Her condition declined and 

the patient decompensated over the past 48-72 hours.  This was rather a quick 

deterioration in her condition.  She had been having also shortness of breath 

for the past 2 days..  In the emergency department, the initial vitals showed 

that the patient was profoundly hypoxic with a pulse ox of 50%.  At that point, 

the patient also had blood work that showed severe lactic acidosis with a lactic

acid level of 11.  She wasgiven a chest x-ray that showed diffuse bilateral 

pulmonary infiltrates.  The patient was given a bolus of 1 L of IV fluid and 

following that she was moved to the intensive care unit.  The patient was kept 

on BiPAP throughout the night and currently she is on a BiPAP pressure of 14/7 

cm of water with an FiO2 of 100%.  Her FiO2 was 85%.  She is responsive, however

she is quite tachypneic and she is breathing in the mid 30s and she is 

struggling with her breathing and earlier this morning she started getting more 

restless and agitated.  We had to start on Precedex which is currently running 

at 0.4 mcg/kg/h.  She seems to be much more comfortable while on Precedex.had a 

bout of atrial fibrillation with rapid ventricular response in the ICU.  The 

heart rate went up to 170 range.  She was given 20 mg of Cardizem IV push.  She 

converted back to normal sinus rhythm.  She became hypotensive and her systolic 

blood pressure in the mid 60s.  She was given additional 2 L of IV fluids and 

the patient is currently on normal saline running at 30 mL an hour.  She will be

also started on norepinephrine infusion.  Lactic acid level is down to 6 based 

on the follow-up blood work.the blood gas showed a pH of 7.29 with a pCO2 of 37 

and pO2 of 80.  This was done a bipolar or 14/7 with an FiO2 of 100%.  Chest x-

ray showing diffuse breath and pulmonary infiltrates.  D-dimer is at 1.09.  The 

patient has a LDH of 1724, CRP level is 77, troponin is at 0.118, the proBNP 

level is 330, the patient has a lactic acid level of 11.4 at time of admission 

her lactic acid level is down to 6.7 and she has an anion gap metabolic acidosis

with a serum bicarb of 14 and a gap of 19.  UA is positive for glucose, +4, and 

COVID19 testing was positive.  As such, her presentation is consistent with 

COVID 19 pneumonia.








03/07/2021, the patient remains intubated on a mechanical ventilator.  The 

patient was intubated yesterday for an acute hypoxic respiratory failure and she

was placed on mechanical ventilator she is currently off of a running at 40 

mcg/kg per minute.  The patient is on no paralytics.  She is quite successful 

mechanical ventilator.  She is an assist-control mode at the rate of 30 with a 

tidal volume of 375 and FiO2 of 60% with a PEEP of this.  The chest x-ray from 

today is showing bilateral perihilar and lower lobe in addition to right upper 

lobe pulmonary rate.  ET tube is in a good location.  It is seen around 2 cm 

above the dasia.  The patient also has a left internal jugular.  Some today 

showed a pH of 7.18 with a pCO2 of 46 and pO2 of 108 and this wasn't an FiO2 of 

60%.  Meanwhile, the patient was quite hypotensive and acidotic and the patient 

had a very high lactic acid level of 11.4 which dropped down to 4.6 with fluid 

resuscitation.  Overall, the patient received a total of 3 L of IV fluid in the 

form of normal saline and the patient is currently running at 130s's an hour of 

normal saline.  Urine output is in order of 30-40 mL an hour and the net fluid 

balance is been +4.8 L over the past 24 hours.  As such the patient is well 

resuscitated.  Most recent blood pressure is 95/44 and the patient is currently 

on pressors at 19 mcg/ per minute of norepinephrine infusion.  The procalcitonin

level was at 0.53 and blood cultures and sputum cultures were sent and the 

patient was covered with empiric antibiotics and the patient was given cefepime 

and Levaquin.  White cell count today's at 24.  She was given Decadron.  She was

given convalescent plasma one unit..  The renal function is stable.  E

lectrolytes are stable.  Potassium level is at 5.6 and this needs to be 

repeated.  Serum bicarb is at 17 which is improving.  Less lactic acid level 

from yesterday was at 4.6.  The patient's blood sugar is at 160 and she is on 

sliding scale coverage for blood sugar control.  Cardiac rhythm is sinus.  No 

further bouts of atrial fibrillation.  She did have one a 1 bouts yesterday 

prior to intubation and she converted back into normal sinus rhythm without any 

major difficulties.





Patient was reevaluated today on 3/8/2021, remains in the ICU, intubated and 

mechanically ventilated.  Patient is on volume control plus, tidal volume is 

450, rate is 24, FiO2 is 60%, PEEP is at 15.  ABG showed a pO2 of 72 pCO2 of 37 

pH of 7.30.  Patient is on propofol at 30 mcg/kg/m, off norepinephrine, IV fluid

is at 13 0 mL per hour.  Patient is on enteral feeding patient received 1 unit 

of convalescent plasma she was out of the window for remdesivir, she is on 

Decadron 20 mg daily, and she is also on Lovenox 40 mg subcu daily.  Today, 

after reviewing her ABG and reviewed her chest x-ray, I felt there is no need to

make any major changes in her ventilator settings.  Hence no changes were made, 

and we will plan to continue present supportive care measures.  Patient remains 

empirically on antibiotics.  Her bicarb is improving not requiring any bicarb 

drip.  She remains on sliding scale coverage for blood sugar.  Presently in 

sinus rhythm, no further episodes of atrial fibrillation.  Chest x-ray continues

to show bilateral multifocal infiltrates with cardiomegaly.  CBC is relatively 

normal.  D-dimer is 1.77.  Basic metabolic profile is normal.  Renal profile is 

normal.  LDH is 1814 and C-reactive protein is 76.3.  Pro-calcitonin is elevated

at 0.74.  Levaquin and Zosyn and remain on board.





Reevaluated today on 3/9/2021, patient remains in the ICU, intubated and 

mechanically ventilated.  She is on assist control rate of 24 tidal volume is 

450 FiO2 is 55% PEEP is 15.  ABG showed a pO2 of 76 pCO2 of 42 pH of 7.26.  Has 

patient was placed on oral bicarb.  Patient is on propofol at 56 mcg/kg/m, 

norepinephrine at 0.03 mcg/kg/m, she is on tube feeding, patient will receive 1 

L of fluid bolus in the formal 0.9 normal saline, and hopefully wean 

norepinephrine down and possibly discontinue.  Chest x-ray continues to show 

bilateral infiltrates.  And again her ABG is marginal on 60% FiO2 her pO2 was 

only 76.  FiO2 was decreased down to 55%.  No major change over the last 24 

hours, patient is basically about the same.  CBC is relatively normal.  D-dimer 

6.82.  Basic metabolic profile is normal.  Renal profile is normal.  LDH is 1473

and C-reactive protein is 47.3.  Patient remains on the Covid 19 cocktails.  

Remains on Decadron 20 mg IV push daily, remains on Zosyn and Levaquin.  Sputum 

cultures are negative.


Hence we will discontinue Levaquin and continue Zosyn





Patient was reevaluated today on 3/10/2021, remains in the ICU, intubated and 

mechanically ventilated.  Patient was initially intubated on 3/6/2021.  Patient 

remains on high PEEP at 15, thyroglobulin is 450, patient is on volume control 

plus, inspiratory time is 0.8 seconds, FiO2 is 55% and PEEP is at 15.  ABG 

showed a pO2 of 69 pCO2 42 pH of 7.27, hence cut down her FiO2 to 50%, and kept 

her on the same ventilator settings.  Patient is on propofol at 55 mcg/kg/m, off

norepinephrine and her IV fluids remains at 1 50 mL per hour.  Chest x-ray 

continues to show bilateral infiltrates, basically the same, does not seem to be

any better or any worse.  Her d-dimer is significantly high today, over 35, 

hence we increased her Lovenox 50 mg subcu twice a day.  Her peak airway 

pressure is 35, plateau pressure is 32.  Urine output is about 40 mL per hour.  

Basic metabolic profile today is normal.  Bicarb remains a bit low at 20.  Renal

profile is normal.  CBC is relatively normal.  Inflammatory markers remain 

elevated LDH is 1476 and C-reactive protein is 29.7.





Objective





- Vital Signs


Vital signs: 


                                   Vital Signs











Temp  97.5 F L  03/10/21 12:00


 


Pulse  75   03/10/21 12:00


 


Resp  24   03/10/21 12:00


 


BP  103/46   03/10/21 12:00


 


Pulse Ox  91 L  03/10/21 12:00








                                 Intake & Output











 03/09/21 03/10/21 03/10/21





 18:59 06:59 18:59


 


Intake Total 3280.690 2371.064 1134.520


 


Output Total 705 665 190


 


Balance 2575.690 1706.064 944.520


 


Weight  94.4 kg 94.4 kg


 


Intake:   


 


  IV 1802.0 1877 876


 


    0.9 Sodium Chloride 120 120 60


 


    Dexamethasone Sod 50  





    Phosphate 20 mg In   





    Dextrose 5% in Water 50   





    ml @ 100 mls/hr IV DAILY   





    [facility] Rx#:[id number]   


 


    Levofloxacin 750Mg-D5w 150  





    Pmx 750 mg In Dextrose/   





    Water 1 150ml.bag @ 100   





    mls/hr IVPB Q48H [facility] Rx#:   





    [id number]   


 


    Piperacillin-Tazobactam 3 50.0 125 





    .375 gm In Sodium   





    Chloride 0.9% 100 ml @ 25   





    mls/hr IVPB Q8H [facility] Rx#:   





    [id number]   


 


    Pressure Bag 72 72 36


 


    Sodium Chloride 0.9% 1, 1360 1560 780





    000 ml @ 130 mls/hr IV .   





    Q7H42M [facility] Rx#:[id number]   


 


  Intake, IV Titration 1052.690 266.064 58.520





  Amount   


 


    Norepinephrine 8 mg In 51.791 67.637 58.520





    Sodium Chloride 0.9% 250   





    ml @ 0.05 MCG/KG/MIN 7.   





    392 mls/hr IV .Q24H [facility]   





    Rx#:[id number]   


 


    Sodium Chloride 0.9% 1, 1000  





    000 ml @ 999 mls/hr IV .   





    Q1H1M [facility] Rx#:[id number]   


 


    propofoL 1,000 mg In 0.899 198.427 





    Empty Bag 1 bag @ Titrate   





    IV .Q0M [facility] Rx#:   





    [id number]   


 


  Tube Feeding 336 168 140


 


  Other 90 60 60


 


Output:   


 


  Urine 705 665 190


 


Other:   


 


  Voiding Method Indwelling Catheter Indwelling Catheter Indwelling Catheter








                       ABP, PAP, CO, CI - Last Documented











Arterial Blood Pressure        127/47

















- Exam





Physical Exam: Revealed a 81-year-old female in no distress, ventilated, 

sedated, comfortable.


Head: Atraumatic, normocephalic


HEENT:[Neck is supple.] [No neck masses.] [No thyromegaly.] [No JVD.] endo 

tracheal tube and orogastric tubes are intact.


Chest: [Clear throughout, minimal crackles at the bases no rhonchi and no 

wheezes.


Cardiac Exam: [Normal S1 and S2, no S3 gallop, no murmur.]


Abdomen: [Soft, nontender,  no megaly, no rebound, no guarding, normal bowel 

sounds.]


Extremities: [No clubbing, no edema, no cyanosis.]


Neurological Exam: Could not assess, patient is sedated and maintained on 

propofol.  She is calm and comfortable, pupils are equally reactive to light.


Psychiatric: Could not be assessed.


Skin: No rashes.





- Labs


CBC & Chem 7: 


                                 03/10/21 04:00





                                 03/10/21 04:00


Labs: 


                  Abnormal Lab Results - Last 24 Hours (Table)











  03/09/21 03/10/21 03/10/21 Range/Units





  17:36 00:16 04:00 


 


MCHC     (31.0-37.0)  g/dL


 


Plt Count     (150-450)  k/uL


 


Lymphocytes # (Manual)     (1.0-4.8)  k/uL


 


Metamyelocytes # (Man)     (0)  k/uL


 


D-Dimer    >35.20 H  (<0.60)  mg/L FEU


 


ABG pH     (7.35-7.45)  


 


ABG pO2     ()  mmHg


 


ABG HCO3     (21-25)  mmol/L


 


ABG O2 Saturation     (94-97)  %


 


Chloride     ()  mmol/L


 


Carbon Dioxide     (22-30)  mmol/L


 


BUN     (7-17)  mg/dL


 


Glucose     (74-99)  mg/dL


 


POC Glucose (mg/dL)  198 H  230 H   (75-99)  mg/dL


 


Calcium     (8.4-10.2)  mg/dL


 


AST     (14-36)  U/L


 


ALT     (4-34)  U/L


 


Lactate Dehydrogenase     (313-618)  U/L


 


C-Reactive Protein     (<10.0)  mg/L


 


Total Protein     (6.3-8.2)  g/dL


 


Albumin     (3.5-5.0)  g/dL














  03/10/21 03/10/21 03/10/21 Range/Units





  04:00 04:00 04:58 


 


MCHC   30.6 L   (31.0-37.0)  g/dL


 


Plt Count   71 L   (150-450)  k/uL


 


Lymphocytes # (Manual)   0.62 L   (1.0-4.8)  k/uL


 


Metamyelocytes # (Man)   0.14 H   (0)  k/uL


 


D-Dimer     (<0.60)  mg/L FEU


 


ABG pH    7.27 L  (7.35-7.45)  


 


ABG pO2    69 L  ()  mmHg


 


ABG HCO3    19 L  (21-25)  mmol/L


 


ABG O2 Saturation    93.8 L  (94-97)  %


 


Chloride  120 H    ()  mmol/L


 


Carbon Dioxide  20 L    (22-30)  mmol/L


 


BUN  33 H    (7-17)  mg/dL


 


Glucose  209 H    (74-99)  mg/dL


 


POC Glucose (mg/dL)     (75-99)  mg/dL


 


Calcium  8.0 L    (8.4-10.2)  mg/dL


 


AST  57 H    (14-36)  U/L


 


ALT  39 H    (4-34)  U/L


 


Lactate Dehydrogenase  1476 H    (313-618)  U/L


 


C-Reactive Protein  29.7 H    (<10.0)  mg/L


 


Total Protein  5.2 L    (6.3-8.2)  g/dL


 


Albumin  2.0 L    (3.5-5.0)  g/dL














  03/10/21 03/10/21 Range/Units





  06:49 12:04 


 


MCHC    (31.0-37.0)  g/dL


 


Plt Count    (150-450)  k/uL


 


Lymphocytes # (Manual)    (1.0-4.8)  k/uL


 


Metamyelocytes # (Man)    (0)  k/uL


 


D-Dimer    (<0.60)  mg/L FEU


 


ABG pH    (7.35-7.45)  


 


ABG pO2    ()  mmHg


 


ABG HCO3    (21-25)  mmol/L


 


ABG O2 Saturation    (94-97)  %


 


Chloride    ()  mmol/L


 


Carbon Dioxide    (22-30)  mmol/L


 


BUN    (7-17)  mg/dL


 


Glucose    (74-99)  mg/dL


 


POC Glucose (mg/dL)  155 H  172 H  (75-99)  mg/dL


 


Calcium    (8.4-10.2)  mg/dL


 


AST    (14-36)  U/L


 


ALT    (4-34)  U/L


 


Lactate Dehydrogenase    (313-618)  U/L


 


C-Reactive Protein    (<10.0)  mg/L


 


Total Protein    (6.3-8.2)  g/dL


 


Albumin    (3.5-5.0)  g/dL








                      Microbiology - Last 24 Hours (Table)











 03/06/21 16:43 Blood Culture - Preliminary





 Blood    No Growth after 72 hours


 


 03/06/21 17:18 Gram Stain - Final





 Sputum Sputum Culture - Final














Assessment and Plan


Assessment: 





Impression:


Acute hypoxic respiratory failure secondary to covid 19 pneumonitis.  Suspect 

some component of ARDS with worsening lung mechanics, and worsening peak airway 

pressure and plateau pressures.


Lymphopenia with thrombocytopenia secondary to above.


Elevated inflammatory markers secondary to Covid 19 pneumonitis.


Type 2 diabetes.


Benign essential hypertension.


Paroxysmal atrial fibrillation.


Hypovolemic hypotension,, remains on fluids off pressors today 


Elevated d-dimer secondary to above.





Recommendation:


Continue ventilatory support.  Minimal changes in the ventilator settings 

including cutting down FiO2 to 50%.


Increase Lovenox to 50 mg subcu twice a day.


Continue Covid 19 cocktail.


Continue Decadron.  6 mg IV push every 12 hours.


Patient was not a candidate for remdesivir and anti-interleukin-6.


Patient received 2 units of convalescent plasma.


Continue nutritional support.  Remains on enteral feeding.


Continue GI and DVT prophylaxis.


Remains critically ill, and prognosis is guarded.


Critical care time is 35 minutes.  We'll continue to follow.








Time with Patient: Greater than 30

## 2021-03-11 LAB
ALBUMIN SERPL-MCNC: 2 G/DL (ref 3.5–5)
ALP SERPL-CCNC: 94 U/L (ref 38–126)
ALT SERPL-CCNC: 41 U/L (ref 4–34)
ANION GAP SERPL CALC-SCNC: 4 MMOL/L
AST SERPL-CCNC: 54 U/L (ref 14–36)
BUN SERPL-SCNC: 36 MG/DL (ref 7–17)
CALCIUM SPEC-MCNC: 8 MG/DL (ref 8.4–10.2)
CELLS COUNTED: 100
CHLORIDE SERPL-SCNC: 120 MMOL/L (ref 98–107)
CK SERPL-CCNC: 39 U/L (ref 30–135)
CO2 BLDA-SCNC: 22 MMOL/L (ref 19–24)
CO2 SERPL-SCNC: 20 MMOL/L (ref 22–30)
ERYTHROCYTE [DISTWIDTH] IN BLOOD BY AUTOMATED COUNT: 3.9 M/UL (ref 3.8–5.4)
ERYTHROCYTE [DISTWIDTH] IN BLOOD: 14.3 % (ref 11.5–15.5)
FERRITIN SERPL-MCNC: 272.9 NG/ML (ref 10–291)
GLUCOSE BLD-MCNC: 174 MG/DL (ref 75–99)
GLUCOSE BLD-MCNC: 200 MG/DL (ref 75–99)
GLUCOSE BLD-MCNC: 201 MG/DL (ref 75–99)
GLUCOSE BLD-MCNC: 205 MG/DL (ref 75–99)
GLUCOSE BLD-MCNC: 214 MG/DL (ref 75–99)
GLUCOSE BLD-MCNC: 248 MG/DL (ref 75–99)
GLUCOSE BLD-MCNC: 251 MG/DL (ref 75–99)
GLUCOSE BLD-MCNC: 256 MG/DL (ref 75–99)
GLUCOSE SERPL-MCNC: 242 MG/DL (ref 74–99)
HCO3 BLDA-SCNC: 21 MMOL/L (ref 21–25)
HCT VFR BLD AUTO: 37.5 % (ref 34–46)
HGB BLD-MCNC: 12.4 GM/DL (ref 11.4–16)
LDH SPEC-CCNC: 1481 U/L (ref 313–618)
LYMPHOCYTES # BLD MANUAL: 0.43 K/UL (ref 1–4.8)
MCH RBC QN AUTO: 31.9 PG (ref 25–35)
MCHC RBC AUTO-ENTMCNC: 33.1 G/DL (ref 31–37)
MCV RBC AUTO: 96.2 FL (ref 80–100)
MONOCYTES # BLD MANUAL: 0.79 K/UL (ref 0–1)
NEUTROPHILS NFR BLD MANUAL: 80 %
NEUTS SEG # BLD MANUAL: 5.9 K/UL (ref 1.3–7.7)
PCO2 BLDA: 44 MMHG (ref 35–45)
PH BLDA: 7.28 [PH] (ref 7.35–7.45)
PLATELET # BLD AUTO: 41 K/UL (ref 150–450)
PO2 BLDA: 60 MMHG (ref 83–108)
POTASSIUM SERPL-SCNC: 4.8 MMOL/L (ref 3.5–5.1)
PROT SERPL-MCNC: 5 G/DL (ref 6.3–8.2)
SODIUM SERPL-SCNC: 144 MMOL/L (ref 137–145)
WBC # BLD AUTO: 7.2 K/UL (ref 3.8–10.6)

## 2021-03-11 RX ADMIN — INSULIN ASPART SCH UNIT: 100 INJECTION, SOLUTION INTRAVENOUS; SUBCUTANEOUS at 00:16

## 2021-03-11 RX ADMIN — PIPERACILLIN AND TAZOBACTAM SCH MLS/HR: 3; .375 INJECTION, POWDER, FOR SOLUTION INTRAVENOUS at 17:14

## 2021-03-11 RX ADMIN — INSULIN ASPART SCH UNIT: 100 INJECTION, SOLUTION INTRAVENOUS; SUBCUTANEOUS at 23:58

## 2021-03-11 RX ADMIN — NOREPINEPHRINE BITARTRATE SCH: 1 INJECTION, SOLUTION, CONCENTRATE INTRAVENOUS at 15:34

## 2021-03-11 RX ADMIN — INSULIN ASPART SCH UNIT: 100 INJECTION, SOLUTION INTRAVENOUS; SUBCUTANEOUS at 12:32

## 2021-03-11 RX ADMIN — PANTOPRAZOLE SODIUM SCH MG: 40 INJECTION, POWDER, FOR SOLUTION INTRAVENOUS at 09:06

## 2021-03-11 RX ADMIN — OXYCODONE HYDROCHLORIDE AND ACETAMINOPHEN SCH MG: 500 TABLET ORAL at 09:08

## 2021-03-11 RX ADMIN — INSULIN ASPART SCH UNIT: 100 INJECTION, SOLUTION INTRAVENOUS; SUBCUTANEOUS at 15:15

## 2021-03-11 RX ADMIN — CHLORHEXIDINE GLUCONATE SCH ML: 1.2 RINSE ORAL at 09:06

## 2021-03-11 RX ADMIN — Medication SCH MG: at 09:07

## 2021-03-11 RX ADMIN — INSULIN ASPART SCH UNIT: 100 INJECTION, SOLUTION INTRAVENOUS; SUBCUTANEOUS at 06:02

## 2021-03-11 RX ADMIN — PIPERACILLIN AND TAZOBACTAM SCH MLS/HR: 3; .375 INJECTION, POWDER, FOR SOLUTION INTRAVENOUS at 01:42

## 2021-03-11 RX ADMIN — Medication SCH MCG: at 09:08

## 2021-03-11 RX ADMIN — DEXTROSE SCH MG: 50 INJECTION, SOLUTION INTRAVENOUS at 09:06

## 2021-03-11 RX ADMIN — ALBUTEROL SULFATE SCH PUFF: 90 AEROSOL, METERED RESPIRATORY (INHALATION) at 20:09

## 2021-03-11 RX ADMIN — ENOXAPARIN SODIUM SCH MG: 60 INJECTION SUBCUTANEOUS at 21:02

## 2021-03-11 RX ADMIN — CHLORHEXIDINE GLUCONATE SCH ML: 1.2 RINSE ORAL at 21:02

## 2021-03-11 RX ADMIN — Medication SCH MG: at 15:23

## 2021-03-11 RX ADMIN — INSULIN ASPART SCH UNIT: 100 INJECTION, SOLUTION INTRAVENOUS; SUBCUTANEOUS at 21:03

## 2021-03-11 RX ADMIN — ENOXAPARIN SODIUM SCH MG: 60 INJECTION SUBCUTANEOUS at 09:08

## 2021-03-11 RX ADMIN — INSULIN ASPART SCH UNIT: 100 INJECTION, SOLUTION INTRAVENOUS; SUBCUTANEOUS at 17:34

## 2021-03-11 RX ADMIN — ALBUTEROL SULFATE SCH PUFF: 90 AEROSOL, METERED RESPIRATORY (INHALATION) at 12:32

## 2021-03-11 RX ADMIN — PIPERACILLIN AND TAZOBACTAM SCH MLS/HR: 3; .375 INJECTION, POWDER, FOR SOLUTION INTRAVENOUS at 09:33

## 2021-03-11 RX ADMIN — CEFAZOLIN SCH: 330 INJECTION, POWDER, FOR SOLUTION INTRAMUSCULAR; INTRAVENOUS at 06:41

## 2021-03-11 RX ADMIN — INSULIN ASPART SCH UNIT: 100 INJECTION, SOLUTION INTRAVENOUS; SUBCUTANEOUS at 21:02

## 2021-03-11 RX ADMIN — ALBUTEROL SULFATE SCH PUFF: 90 AEROSOL, METERED RESPIRATORY (INHALATION) at 16:36

## 2021-03-11 RX ADMIN — Medication SCH MG: at 21:02

## 2021-03-11 RX ADMIN — ALBUTEROL SULFATE SCH PUFF: 90 AEROSOL, METERED RESPIRATORY (INHALATION) at 08:16

## 2021-03-11 RX ADMIN — DEXTROSE SCH MG: 50 INJECTION, SOLUTION INTRAVENOUS at 21:02

## 2021-03-11 RX ADMIN — CEFAZOLIN SCH MLS/HR: 330 INJECTION, POWDER, FOR SOLUTION INTRAMUSCULAR; INTRAVENOUS at 15:20

## 2021-03-11 RX ADMIN — Medication SCH MG: at 09:08

## 2021-03-11 RX ADMIN — CEFAZOLIN SCH MLS/HR: 330 INJECTION, POWDER, FOR SOLUTION INTRAMUSCULAR; INTRAVENOUS at 01:41

## 2021-03-11 NOTE — P.PN
Subjective


Progress Note Date: 03/11/21


Principal diagnosis: 





Acute hypoxic respiratory failure secondary to Covid 19 pneumonitis.








this 81-year-old female patient, known history of diabetes and hypertension, was

brought into the emergency department upon the request of her family because of 

generalized weakness, falls and confusion x6 days.  Her condition declined and 

the patient decompensated over the past 48-72 hours.  This was rather a quick 

deterioration in her condition.  She had been having also shortness of breath 

for the past 2 days..  In the emergency department, the initial vitals showed 

that the patient was profoundly hypoxic with a pulse ox of 50%.  At that point, 

the patient also had blood work that showed severe lactic acidosis with a lactic

acid level of 11.  She wasgiven a chest x-ray that showed diffuse bilateral 

pulmonary infiltrates.  The patient was given a bolus of 1 L of IV fluid and 

following that she was moved to the intensive care unit.  The patient was kept 

on BiPAP throughout the night and currently she is on a BiPAP pressure of 14/7 

cm of water with an FiO2 of 100%.  Her FiO2 was 85%.  She is responsive, however

she is quite tachypneic and she is breathing in the mid 30s and she is 

struggling with her breathing and earlier this morning she started getting more 

restless and agitated.  We had to start on Precedex which is currently running 

at 0.4 mcg/kg/h.  She seems to be much more comfortable while on Precedex.had a 

bout of atrial fibrillation with rapid ventricular response in the ICU.  The 

heart rate went up to 170 range.  She was given 20 mg of Cardizem IV push.  She 

converted back to normal sinus rhythm.  She became hypotensive and her systolic 

blood pressure in the mid 60s.  She was given additional 2 L of IV fluids and 

the patient is currently on normal saline running at 30 mL an hour.  She will be

also started on norepinephrine infusion.  Lactic acid level is down to 6 based 

on the follow-up blood work.the blood gas showed a pH of 7.29 with a pCO2 of 37 

and pO2 of 80.  This was done a bipolar or 14/7 with an FiO2 of 100%.  Chest x-

ray showing diffuse breath and pulmonary infiltrates.  D-dimer is at 1.09.  The 

patient has a LDH of 1724, CRP level is 77, troponin is at 0.118, the proBNP 

level is 330, the patient has a lactic acid level of 11.4 at time of admission 

her lactic acid level is down to 6.7 and she has an anion gap metabolic acidosis

with a serum bicarb of 14 and a gap of 19.  UA is positive for glucose, +4, and 

COVID19 testing was positive.  As such, her presentation is consistent with 

COVID 19 pneumonia.








03/07/2021, the patient remains intubated on a mechanical ventilator.  The 

patient was intubated yesterday for an acute hypoxic respiratory failure and she

was placed on mechanical ventilator she is currently off of a running at 40 

mcg/kg per minute.  The patient is on no paralytics.  She is quite successful 

mechanical ventilator.  She is an assist-control mode at the rate of 30 with a 

tidal volume of 375 and FiO2 of 60% with a PEEP of this.  The chest x-ray from 

today is showing bilateral perihilar and lower lobe in addition to right upper 

lobe pulmonary rate.  ET tube is in a good location.  It is seen around 2 cm 

above the dasia.  The patient also has a left internal jugular.  Some today 

showed a pH of 7.18 with a pCO2 of 46 and pO2 of 108 and this wasn't an FiO2 of 

60%.  Meanwhile, the patient was quite hypotensive and acidotic and the patient 

had a very high lactic acid level of 11.4 which dropped down to 4.6 with fluid 

resuscitation.  Overall, the patient received a total of 3 L of IV fluid in the 

form of normal saline and the patient is currently running at 130s's an hour of 

normal saline.  Urine output is in order of 30-40 mL an hour and the net fluid 

balance is been +4.8 L over the past 24 hours.  As such the patient is well 

resuscitated.  Most recent blood pressure is 95/44 and the patient is currently 

on pressors at 19 mcg/ per minute of norepinephrine infusion.  The procalcitonin

level was at 0.53 and blood cultures and sputum cultures were sent and the 

patient was covered with empiric antibiotics and the patient was given cefepime 

and Levaquin.  White cell count today's at 24.  She was given Decadron.  She was

given convalescent plasma one unit..  The renal function is stable.  E

lectrolytes are stable.  Potassium level is at 5.6 and this needs to be 

repeated.  Serum bicarb is at 17 which is improving.  Less lactic acid level 

from yesterday was at 4.6.  The patient's blood sugar is at 160 and she is on 

sliding scale coverage for blood sugar control.  Cardiac rhythm is sinus.  No 

further bouts of atrial fibrillation.  She did have one a 1 bouts yesterday 

prior to intubation and she converted back into normal sinus rhythm without any 

major difficulties.





Patient was reevaluated today on 3/8/2021, remains in the ICU, intubated and 

mechanically ventilated.  Patient is on volume control plus, tidal volume is 

450, rate is 24, FiO2 is 60%, PEEP is at 15.  ABG showed a pO2 of 72 pCO2 of 37 

pH of 7.30.  Patient is on propofol at 30 mcg/kg/m, off norepinephrine, IV fluid

is at 13 0 mL per hour.  Patient is on enteral feeding patient received 1 unit 

of convalescent plasma she was out of the window for remdesivir, she is on 

Decadron 20 mg daily, and she is also on Lovenox 40 mg subcu daily.  Today, 

after reviewing her ABG and reviewed her chest x-ray, I felt there is no need to

make any major changes in her ventilator settings.  Hence no changes were made, 

and we will plan to continue present supportive care measures.  Patient remains 

empirically on antibiotics.  Her bicarb is improving not requiring any bicarb 

drip.  She remains on sliding scale coverage for blood sugar.  Presently in 

sinus rhythm, no further episodes of atrial fibrillation.  Chest x-ray continues

to show bilateral multifocal infiltrates with cardiomegaly.  CBC is relatively 

normal.  D-dimer is 1.77.  Basic metabolic profile is normal.  Renal profile is 

normal.  LDH is 1814 and C-reactive protein is 76.3.  Pro-calcitonin is elevated

at 0.74.  Levaquin and Zosyn and remain on board.





Reevaluated today on 3/9/2021, patient remains in the ICU, intubated and 

mechanically ventilated.  She is on assist control rate of 24 tidal volume is 

450 FiO2 is 55% PEEP is 15.  ABG showed a pO2 of 76 pCO2 of 42 pH of 7.26.  Has 

patient was placed on oral bicarb.  Patient is on propofol at 56 mcg/kg/m, 

norepinephrine at 0.03 mcg/kg/m, she is on tube feeding, patient will receive 1 

L of fluid bolus in the formal 0.9 normal saline, and hopefully wean 

norepinephrine down and possibly discontinue.  Chest x-ray continues to show 

bilateral infiltrates.  And again her ABG is marginal on 60% FiO2 her pO2 was 

only 76.  FiO2 was decreased down to 55%.  No major change over the last 24 

hours, patient is basically about the same.  CBC is relatively normal.  D-dimer 

6.82.  Basic metabolic profile is normal.  Renal profile is normal.  LDH is 1473

and C-reactive protein is 47.3.  Patient remains on the Covid 19 cocktails.  

Remains on Decadron 20 mg IV push daily, remains on Zosyn and Levaquin.  Sputum 

cultures are negative.


Hence we will discontinue Levaquin and continue Zosyn





Patient was reevaluated today on 3/10/2021, remains in the ICU, intubated and 

mechanically ventilated.  Patient was initially intubated on 3/6/2021.  Patient 

remains on high PEEP at 15, thyroglobulin is 450, patient is on volume control 

plus, inspiratory time is 0.8 seconds, FiO2 is 55% and PEEP is at 15.  ABG 

showed a pO2 of 69 pCO2 42 pH of 7.27, hence cut down her FiO2 to 50%, and kept 

her on the same ventilator settings.  Patient is on propofol at 55 mcg/kg/m, off

norepinephrine and her IV fluids remains at 1 50 mL per hour.  Chest x-ray 

continues to show bilateral infiltrates, basically the same, does not seem to be

any better or any worse.  Her d-dimer is significantly high today, over 35, 

hence we increased her Lovenox 50 mg subcu twice a day.  Her peak airway 

pressure is 35, plateau pressure is 32.  Urine output is about 40 mL per hour.  

Basic metabolic profile today is normal.  Bicarb remains a bit low at 20.  Renal

profile is normal.  CBC is relatively normal.  Inflammatory markers remain 

elevated LDH is 1476 and C-reactive protein is 29.7.





Patient was reevaluated today on 3/11/2021, remains in ICU, intubated and 

mechanically ventilated.  Her ventilator settings are volume control plus at 

450, FiO2 is 65% PEEP is 15 and the rate is 24.  ABG remains marginal with a pO2

of 60 pCO2 of 44 pH of 7.28.  Remains on propofol not requiring any other 

sedation at this point.  Remains on enteral feeding via orogastric tube, she is 

up to goal.  Chest x-ray is basically about the same continues to show bilateral

interstitial infiltrates.  It is not any better and not any worse.  Patient is 

hemodynamically stable, not requiring any pressors.  Her d-dimer is 34  hence 

the Lovenox dose was increased.  Her C-reactive protein is 15.1 [place]DH [place] [address]

labs today were reviewed, chest x-ray was reviewed and I updated her daughter on

her condition.  The family seems to be inclined to consider comfort care 

measures in the meantime the CODE STATUS was changed to DO NOT RESUSCITATE CODE 

STATUS.  Daughter was made aware that her condition has not shown any 

improvement since the day she was intubated on 3/6/2021.  I believe her overall 

clinical status is unchanged.





Objective





- Vital Signs


Vital signs: 


                                   Vital Signs











Temp  97.6 F   03/11/21 08:00


 


Pulse  90   03/11/21 12:00


 


Resp  24   03/11/21 12:00


 


BP  119/51   03/11/21 07:00


 


Pulse Ox  92 L  03/11/21 12:00








                                 Intake & Output











 03/10/21 03/11/21 03/11/21





 18:59 06:59 18:59


 


Intake Total 2040.520 2093.261 934.037


 


Output Total 400 720 210


 


Balance 6871.157 0845.261 724.037


 


Weight 94.4 kg 96.3 kg 


 


Intake:   


 


  IV 1232 212 680


 


    0.9 Sodium Chloride 120 10 


 


    Pressure Bag 72 72 30


 


    Sodium Chloride 0.9% 1, 1040 130 650





    000 ml @ 130 mls/hr IV .   





    Q7H42M ENRIQUE Rx#:[id number]   


 


  Intake, IV Titration 048.256 1721.261 112.037





  Amount   


 


    Norepinephrine 8 mg In 58.520  





    Sodium Chloride 0.9% 250   





    ml @ 0.05 MCG/KG/MIN 7.   





    392 mls/hr IV .Q24H ENRIQUE   





    Rx#:[id number]   


 


    Sodium Chloride 0.9% 1,  1300 





    000 ml @ 130 mls/hr IV .   





    Q7H42M ENRIQUE Rx#:[id number]   


 


    propofoL 1,000 mg In 100 183.261 112.037





    Empty Bag 1 bag @ Titrate   





    IV .Q0M ENRIQUE Rx#:   





    [id number]   


 


  Tube Feeding 308 308 112


 


  Blood Product 252  


 


    Ffp Pher Conval High 252  





    Titer Ct2  Unit   





    [id number]   


 


  Other 90 90 30


 


Output:   


 


  Urine 400 720 210


 


Other:   


 


  Voiding Method Indwelling Catheter Indwelling Catheter Indwelling Catheter








                       ABP, PAP, CO, CI - Last Documented











Arterial Blood Pressure        164/53

















- Exam





Physical Exam: Revealed a 81-year-old female in no distress, ventilated, 

sedated, comfortable.


Head: Atraumatic, normocephalic


HEENT:[Neck is supple.] [No neck masses.] [No thyromegaly.] [No JVD.] endo 

tracheal tube and orogastric tubes are intact.


Chest: [Clear throughout, minimal crackles at the bases no rhonchi and no 

wheezes.


Cardiac Exam: [Normal S1 and S2, no S3 gallop, no murmur.]


Abdomen: [Soft, nontender,  no megaly, no rebound, no guarding, normal bowel 

sounds.]


Extremities: [No clubbing, no edema, no cyanosis.]


Neurological Exam: Could not assess, patient is sedated and maintained on 

propofol.  She is calm and comfortable, pupils are equally reactive to light.  

With holding sedation will be done today for assessment of mental status.


Psychiatric: Could not be assessed.


Skin: No rashes.





- Labs


CBC & Chem 7: 


                                 03/11/21 04:20





                                 03/11/21 04:20


Labs: 


                  Abnormal Lab Results - Last 24 Hours (Table)











  03/10/21 03/11/21 03/11/21 Range/Units





  17:52 00:05 04:20 


 


Plt Count    41 L  (150-450)  k/uL


 


Lymphocytes # (Manual)    0.43 L  (1.0-4.8)  k/uL


 


D-Dimer     (<0.60)  mg/L FEU


 


ABG pH     (7.35-7.45)  


 


ABG pO2     ()  mmHg


 


ABG O2 Saturation     (94-97)  %


 


Chloride     ()  mmol/L


 


Carbon Dioxide     (22-30)  mmol/L


 


BUN     (7-17)  mg/dL


 


Glucose     (74-99)  mg/dL


 


POC Glucose (mg/dL)  178 H  201 H   (75-99)  mg/dL


 


Calcium     (8.4-10.2)  mg/dL


 


AST     (14-36)  U/L


 


ALT     (4-34)  U/L


 


Lactate Dehydrogenase     (313-618)  U/L


 


C-Reactive Protein     (<10.0)  mg/L


 


Total Protein     (6.3-8.2)  g/dL


 


Albumin     (3.5-5.0)  g/dL














  03/11/21 03/11/21 03/11/21 Range/Units





  04:20 04:20 05:30 


 


Plt Count     (150-450)  k/uL


 


Lymphocytes # (Manual)     (1.0-4.8)  k/uL


 


D-Dimer   >34.11 H   (<0.60)  mg/L FEU


 


ABG pH    7.28 L  (7.35-7.45)  


 


ABG pO2    60 L  ()  mmHg


 


ABG O2 Saturation    90.1 L  (94-97)  %


 


Chloride  120 H    ()  mmol/L


 


Carbon Dioxide  20 L    (22-30)  mmol/L


 


BUN  36 H    (7-17)  mg/dL


 


Glucose  242 H    (74-99)  mg/dL


 


POC Glucose (mg/dL)     (75-99)  mg/dL


 


Calcium  8.0 L    (8.4-10.2)  mg/dL


 


AST  54 H    (14-36)  U/L


 


ALT  41 H    (4-34)  U/L


 


Lactate Dehydrogenase  1481 H    (313-618)  U/L


 


C-Reactive Protein  15.1 H    (<10.0)  mg/L


 


Total Protein  5.0 L    (6.3-8.2)  g/dL


 


Albumin  2.0 L    (3.5-5.0)  g/dL














  03/11/21 03/11/21 Range/Units





  05:33 11:56 


 


Plt Count    (150-450)  k/uL


 


Lymphocytes # (Manual)    (1.0-4.8)  k/uL


 


D-Dimer    (<0.60)  mg/L FEU


 


ABG pH    (7.35-7.45)  


 


ABG pO2    ()  mmHg


 


ABG O2 Saturation    (94-97)  %


 


Chloride    ()  mmol/L


 


Carbon Dioxide    (22-30)  mmol/L


 


BUN    (7-17)  mg/dL


 


Glucose    (74-99)  mg/dL


 


POC Glucose (mg/dL)  205 H  214 H  (75-99)  mg/dL


 


Calcium    (8.4-10.2)  mg/dL


 


AST    (14-36)  U/L


 


ALT    (4-34)  U/L


 


Lactate Dehydrogenase    (313-618)  U/L


 


C-Reactive Protein    (<10.0)  mg/L


 


Total Protein    (6.3-8.2)  g/dL


 


Albumin    (3.5-5.0)  g/dL








                      Microbiology - Last 24 Hours (Table)











 03/06/21 16:43 Blood Culture - Preliminary





 Blood    No Growth after 96 hours














Assessment and Plan


Assessment: 





Impression:


Acute hypoxic respiratory failure secondary to covid 19 pneumonitis.  Also s

uspect ARDS.


Lymphopenia with thrombocytopenia secondary to above.


Elevated inflammatory markers secondary to Covid 19 pneumonitis.


Type 2 diabetes.


Benign essential hypertension.


Paroxysmal atrial fibrillation.


Hypovolemic hypotension,, remains on fluids off pressors today 


Elevated d-dimer secondary to above.





Recommendation:


Continue ventilatory support.  No changes were made on the ventilator today.


Continue Lovenox to 50 mg subcu twice a day.


Continue Covid 19 cocktail.


Continue Decadron.  6 mg IV push every 12 hours.


Patient was not a candidate for remdesivir and anti-interleukin-6.


Patient received 2 units of convalescent plasma.


Continue nutritional support. 


Continue GI and DVT prophylaxis.


Remains critically ill, and prognosis is guarded.


Updated her daughter over the phone on her condition, CODE STATUS was changed to

DO NOT RESUSCITATE.


Family is considering possibly comfort care measures.


Critical care time over 30 minutes  We'll continue to follow.








Time with Patient: Greater than 30

## 2021-03-11 NOTE — XR
EXAMINATION TYPE: XR chest 1V portable

 

DATE OF EXAM: 3/11/2021

 

Comparison: 3/10/2021

 

Clinical History: 81-year-old female Tube placement

 

Findings:

ET tube is satisfactory. Left CVC tip in the upper right atrium. NG tube courses below the diaphragm.
 Heart upper limits of normal in size. Diffuse interstitial and patchy mid and lower lung opacities u
nchanged. Air bronchograms in the retrocardiac region again noted.

 

 

Impression:

Continued interstitial opacities and mid and lower lung airspace disease, possible pulmonary edema. C
linically correlate.

## 2021-03-11 NOTE — P.PN
Subjective


Progress Note Date: 03/11/21








Treasure Wheeler, is a 81-year-old female patient of Dr. Falcon, who 

presented to [facility] emergency room with a chief complaint 

of worsening shortness of breath generalized weakness and confusion, symptoms 

started about 6 days prior to admission but her condition declined significantly

in the last 2 days.  Patient was evaluated in emergency room vital examination 

on presentation revealed a temperature of 98.4 pulse 133 respiration 30 blood 

pressure 135/67 pulse ox 55% on room air her white blood count was 3.8 

hemoglobin 15.9 platelet count 99 sodium 133 potassium 4.5 plasma lactic acid 

11.4 BUN 17 creatinine 0.9 arterial blood gases revealed a pH of 7.19 pCO2 49 by

mouth to 59 .  Chest x-ray done in the emergency room revealed moderate 

pulmonary edema that could relate to congestive heart failure or ARDS , EKG 

revealed sinus tachycardia with right bundle branch block , coronavirus PCR was 

positive.


patient was admitted to intensive care unit she was started on BiPAP.  Patient 

developed atrial fibrillation with rapid ventricular response with a heart rate 

of 170 she was started on Cardizem drip, she had episodes of hypotension, she 

was started on levophed drip, and earlier this morning she required intubation 

and mechanical ventilation.


Her past medical history is significant for history of hypertension, history of 

hyperlipidemia, history of insulin-dependent diabetes mellitus, and history of 

osteoporosis





On 03/07/2021 patient was seen and examined in the ICU she is intubated sedated 

maintained on mechanical ventilation she is on assist control rate of 30 FiO2 

60% and a PEEP of 15.  PH is 7.18 pCO2 46 by mouth 02/01/2008 lactic acid is 

down to 4.6 blood pressure 100/52 she is still maintained on levophed infusion, 

she is maintained on IV Decadron she received 1 unit of convalescent plasma, 

white blood count 24.6 hemoglobin 14.1 platelet count 181 sodium 136 potassium 

5.6 chloride 110 CO2 17 BUN 28 creatinine 1.2 glucose level 165





On 03/08/2021 patient was seen and examined in the ICU she is intubated sedated 

maintained on mechanical ventilation, today she was taken off of vasopressors 

and has been tolerating well blood pressure at this time 98/55, pH is 7.30 pCO2 

37 by mouth to 72 white blood count is down to 6.6 hemoglobin 12.6 platelet cou

nt 107 d-dimer is 1.77 BUN 34 creatinine 0.96





On 03/09/2021 patient was seen and examined in the ICU she remains intubated 

sedated maintained on mechanical ventilation FiO2 55% rate of 24 and PEEP of 15 

temperature is 97.5 pulse 79 respiration 24 blood pressure 127/58 pulse ox 91% 

on FiO2 55% mechanical ventilation d-dimer 6.8 to pH 7.26 pCO2 42 by mouth to 76

white blood count is 5.8 hemoglobin 12.6 platelet count 102 BUN 37 creatinine 

0.96 liver enzymes are elevated with AST at 71 a LT at 38





On 03/10/2021 patient was seen and examined in the ICU she is intubated sedated 

maintained on mechanical ventilation FiO2 55% rate of 24 and PEEP of 15 her 

vital examination reveals a temperature of 97.5 pulse 75 respiration 24 blood 

pressure 103/46 pulse ox 91% arterial blood gas reveals a pH of 7.27 pCO2 42 pO2

69


White blood count is 6.9 hemoglobin 12.8 platelet count 71 sodium 141 potassium 

4.7 chloride 120 CO2 20 BUN 33 creatinine 0.73 patient is not on any IV pressors

at this time.





On 03/11/2021 patient was seen and examined in the ICU she is intubated sedated 

maintained on mechanical ventilation, she is on FiO2 of 65% rate 24 PEEP of 15, 

temperature 97.6 pulse 51 respiration 24 blood pressure 131/43 pulse ox 94% ABG 

reveals a pH of 7.28 pCO2 44 PO2 60 white blood count is 7.2 hemoglobin 12.4 

platelet count 41 sodium 144 potassium 4.8 chloride 120 glucose level 242 at 

this time will change Accu-Chek and coverage to every 3 hours





Objective





- Vital Signs


Vital signs: 


                                   Vital Signs











Temp  98.2 F   03/11/21 04:00


 


Pulse  67   03/11/21 07:00


 


Resp  24   03/11/21 07:00


 


BP  119/51   03/11/21 07:00


 


Pulse Ox  93 L  03/11/21 07:00








                                 Intake & Output











 03/10/21 03/11/21 03/11/21





 18:59 06:59 18:59


 


Intake Total 2040.520 2093.261 522


 


Output Total 400 720 110


 


Balance 4928.619 9005.261 412


 


Weight 94.4 kg 96.3 kg 


 


Intake:   


 


  IV 1232 212 408


 


    0.9 Sodium Chloride 120 10 


 


    Pressure Bag 72 72 18


 


    Sodium Chloride 0.9% 1, 1040 130 390





    000 ml @ 130 mls/hr IV .   





    Q7H42M ENRIQUE Rx#:[id number]   


 


  Intake, IV Titration 449.510 9347.261 





  Amount   


 


    Norepinephrine 8 mg In 58.520  





    Sodium Chloride 0.9% 250   





    ml @ 0.05 MCG/KG/MIN 7.   





    392 mls/hr IV .Q24H ENRIQUE   





    Rx#:[id number]   


 


    Sodium Chloride 0.9% 1,  1300 





    000 ml @ 130 mls/hr IV .   





    Q7H42M ENRIQUE Rx#:[id number]   


 


    propofoL 1,000 mg In 100 183.261 





    Empty Bag 1 bag @ Titrate   





    IV .Q0M ENRIQUE Rx#:   





    [id number]   


 


  Tube Feeding 308 308 84


 


  Blood Product 252  


 


    Ffp Pher Conval High 252  





    Titer Ct2  Unit   





    [id number]   


 


  Other 90 90 30


 


Output:   


 


  Urine 400 720 110


 


Other:   


 


  Voiding Method Indwelling Catheter Indwelling Catheter 








                       ABP, PAP, CO, CI - Last Documented











Arterial Blood Pressure        135/44

















- Exam








Patient is intubated sedated maintained on mechanical ventilation


HEENT head normocephalic and atraumatic


Neck is supple no JVD no goiter no lymphadenopathy


Chest exam reveals a few scattered rhonchi no wheezing


Cardiac exam reveals regular heart sounds no gallops no murmurs


Abdomen is soft nontender no organomegaly with normal bowel sounds


Extremity exam reveals no edema no cyanosis or clubbing








- Labs


CBC & Chem 7: 


                                 03/11/21 04:20





                                 03/11/21 04:20


Labs: 


                  Abnormal Lab Results - Last 24 Hours (Table)











  03/10/21 03/10/21 03/11/21 Range/Units





  12:04 17:52 00:05 


 


Plt Count     (150-450)  k/uL


 


Lymphocytes # (Manual)     (1.0-4.8)  k/uL


 


D-Dimer     (<0.60)  mg/L FEU


 


ABG pH     (7.35-7.45)  


 


ABG pO2     ()  mmHg


 


ABG O2 Saturation     (94-97)  %


 


Chloride     ()  mmol/L


 


Carbon Dioxide     (22-30)  mmol/L


 


BUN     (7-17)  mg/dL


 


Glucose     (74-99)  mg/dL


 


POC Glucose (mg/dL)  172 H  178 H  201 H  (75-99)  mg/dL


 


Calcium     (8.4-10.2)  mg/dL


 


AST     (14-36)  U/L


 


ALT     (4-34)  U/L


 


Lactate Dehydrogenase     (313-618)  U/L


 


C-Reactive Protein     (<10.0)  mg/L


 


Total Protein     (6.3-8.2)  g/dL


 


Albumin     (3.5-5.0)  g/dL














  03/11/21 03/11/21 03/11/21 Range/Units





  04:20 04:20 04:20 


 


Plt Count  41 L    (150-450)  k/uL


 


Lymphocytes # (Manual)  0.43 L    (1.0-4.8)  k/uL


 


D-Dimer    >34.11 H  (<0.60)  mg/L FEU


 


ABG pH     (7.35-7.45)  


 


ABG pO2     ()  mmHg


 


ABG O2 Saturation     (94-97)  %


 


Chloride   120 H   ()  mmol/L


 


Carbon Dioxide   20 L   (22-30)  mmol/L


 


BUN   36 H   (7-17)  mg/dL


 


Glucose   242 H   (74-99)  mg/dL


 


POC Glucose (mg/dL)     (75-99)  mg/dL


 


Calcium   8.0 L   (8.4-10.2)  mg/dL


 


AST   54 H   (14-36)  U/L


 


ALT   41 H   (4-34)  U/L


 


Lactate Dehydrogenase   1481 H   (313-618)  U/L


 


C-Reactive Protein   15.1 H   (<10.0)  mg/L


 


Total Protein   5.0 L   (6.3-8.2)  g/dL


 


Albumin   2.0 L   (3.5-5.0)  g/dL














  03/11/21 03/11/21 Range/Units





  05:30 05:33 


 


Plt Count    (150-450)  k/uL


 


Lymphocytes # (Manual)    (1.0-4.8)  k/uL


 


D-Dimer    (<0.60)  mg/L FEU


 


ABG pH  7.28 L   (7.35-7.45)  


 


ABG pO2  60 L   ()  mmHg


 


ABG O2 Saturation  90.1 L   (94-97)  %


 


Chloride    ()  mmol/L


 


Carbon Dioxide    (22-30)  mmol/L


 


BUN    (7-17)  mg/dL


 


Glucose    (74-99)  mg/dL


 


POC Glucose (mg/dL)   205 H  (75-99)  mg/dL


 


Calcium    (8.4-10.2)  mg/dL


 


AST    (14-36)  U/L


 


ALT    (4-34)  U/L


 


Lactate Dehydrogenase    (313-618)  U/L


 


C-Reactive Protein    (<10.0)  mg/L


 


Total Protein    (6.3-8.2)  g/dL


 


Albumin    (3.5-5.0)  g/dL








                      Microbiology - Last 24 Hours (Table)











 03/06/21 16:43 Blood Culture - Preliminary





 Blood    No Growth after 96 hours














Assessment and Plan


Plan: 





1. Acute hypoxic respiratory failure, requiring intubation and mechanical 

ventilation





2. Poitive Covid 19 testing





3. bilateral pulmonary infiltrates, likely related to pneumonia due to Covid 19 

virus





4.  Episode of atrial fibrillation with rapid ventricular response





5. Acute lactic acidosis improving





6. Episode of hypotension improved with IV fluid and levophed drip





7. Underlying history of hypertension





8. Underlying history of hyperlipidemia





9. Underlying history of diabetes mellitus





Currently patient is intubated sedated maintained on mechanical ventilation


She is maintained on IV Decadron and subcu Lovenox


Will follow closely prognosis is guarded due to age and severity of illness

## 2021-03-12 VITALS — RESPIRATION RATE: 24 BRPM

## 2021-03-12 LAB
ALBUMIN SERPL-MCNC: 1.9 G/DL (ref 3.5–5)
ALP SERPL-CCNC: 105 U/L (ref 38–126)
ALT SERPL-CCNC: 44 U/L (ref 4–34)
ANION GAP SERPL CALC-SCNC: 0 MMOL/L
AST SERPL-CCNC: 56 U/L (ref 14–36)
BUN SERPL-SCNC: 36 MG/DL (ref 7–17)
CALCIUM SPEC-MCNC: 8.2 MG/DL (ref 8.4–10.2)
CELLS COUNTED: 100
CHLORIDE SERPL-SCNC: 123 MMOL/L (ref 98–107)
CO2 BLDA-SCNC: 23 MMOL/L (ref 19–24)
CO2 SERPL-SCNC: 22 MMOL/L (ref 22–30)
ERYTHROCYTE [DISTWIDTH] IN BLOOD BY AUTOMATED COUNT: 4.14 M/UL (ref 3.8–5.4)
ERYTHROCYTE [DISTWIDTH] IN BLOOD: 14.7 % (ref 11.5–15.5)
GLUCOSE BLD-MCNC: 155 MG/DL (ref 75–99)
GLUCOSE BLD-MCNC: 155 MG/DL (ref 75–99)
GLUCOSE BLD-MCNC: 163 MG/DL (ref 75–99)
GLUCOSE BLD-MCNC: 164 MG/DL (ref 75–99)
GLUCOSE BLD-MCNC: 168 MG/DL (ref 75–99)
GLUCOSE BLD-MCNC: 173 MG/DL (ref 75–99)
GLUCOSE BLD-MCNC: 209 MG/DL (ref 75–99)
GLUCOSE BLD-MCNC: 214 MG/DL (ref 75–99)
GLUCOSE SERPL-MCNC: 206 MG/DL (ref 74–99)
HCO3 BLDA-SCNC: 22 MMOL/L (ref 21–25)
HCT VFR BLD AUTO: 40.3 % (ref 34–46)
HGB BLD-MCNC: 12.5 GM/DL (ref 11.4–16)
LDH SPEC-CCNC: 1832 U/L (ref 313–618)
LYMPHOCYTES # BLD MANUAL: 0.16 K/UL (ref 1–4.8)
MCH RBC QN AUTO: 30.2 PG (ref 25–35)
MCHC RBC AUTO-ENTMCNC: 31 G/DL (ref 31–37)
MCV RBC AUTO: 97.4 FL (ref 80–100)
MONOCYTES # BLD MANUAL: 0.16 K/UL (ref 0–1)
MYELOCYTES # BLD MANUAL: 0.23 K/UL
NEUTROPHILS NFR BLD MANUAL: 93 %
NEUTS SEG # BLD MANUAL: 7.25 K/UL (ref 1.3–7.7)
PCO2 BLDA: 43 MMHG (ref 35–45)
PH BLDA: 7.31 [PH] (ref 7.35–7.45)
PLATELET # BLD AUTO: 41 K/UL (ref 150–450)
PO2 BLDA: 63 MMHG (ref 83–108)
POTASSIUM SERPL-SCNC: 4.9 MMOL/L (ref 3.5–5.1)
PROT SERPL-MCNC: 5 G/DL (ref 6.3–8.2)
SODIUM SERPL-SCNC: 145 MMOL/L (ref 137–145)
WBC # BLD AUTO: 7.8 K/UL (ref 3.8–10.6)

## 2021-03-12 RX ADMIN — CEFAZOLIN SCH MLS/HR: 330 INJECTION, POWDER, FOR SOLUTION INTRAMUSCULAR; INTRAVENOUS at 04:29

## 2021-03-12 RX ADMIN — CEFAZOLIN SCH: 330 INJECTION, POWDER, FOR SOLUTION INTRAMUSCULAR; INTRAVENOUS at 03:18

## 2021-03-12 RX ADMIN — OXYCODONE HYDROCHLORIDE AND ACETAMINOPHEN SCH MG: 500 TABLET ORAL at 08:38

## 2021-03-12 RX ADMIN — INSULIN ASPART SCH UNIT: 100 INJECTION, SOLUTION INTRAVENOUS; SUBCUTANEOUS at 23:52

## 2021-03-12 RX ADMIN — ALBUTEROL SULFATE SCH PUFF: 90 AEROSOL, METERED RESPIRATORY (INHALATION) at 19:17

## 2021-03-12 RX ADMIN — Medication SCH MCG: at 08:38

## 2021-03-12 RX ADMIN — CHLORHEXIDINE GLUCONATE SCH ML: 1.2 RINSE ORAL at 08:38

## 2021-03-12 RX ADMIN — Medication SCH MG: at 15:09

## 2021-03-12 RX ADMIN — INSULIN ASPART SCH UNIT: 100 INJECTION, SOLUTION INTRAVENOUS; SUBCUTANEOUS at 08:39

## 2021-03-12 RX ADMIN — ENOXAPARIN SODIUM SCH MG: 60 INJECTION SUBCUTANEOUS at 08:38

## 2021-03-12 RX ADMIN — CHLORHEXIDINE GLUCONATE SCH ML: 1.2 RINSE ORAL at 20:50

## 2021-03-12 RX ADMIN — ALBUTEROL SULFATE SCH PUFF: 90 AEROSOL, METERED RESPIRATORY (INHALATION) at 12:11

## 2021-03-12 RX ADMIN — INSULIN ASPART SCH UNIT: 100 INJECTION, SOLUTION INTRAVENOUS; SUBCUTANEOUS at 03:03

## 2021-03-12 RX ADMIN — Medication SCH MG: at 08:39

## 2021-03-12 RX ADMIN — INSULIN ASPART SCH UNIT: 100 INJECTION, SOLUTION INTRAVENOUS; SUBCUTANEOUS at 15:09

## 2021-03-12 RX ADMIN — INSULIN ASPART SCH UNIT: 100 INJECTION, SOLUTION INTRAVENOUS; SUBCUTANEOUS at 17:31

## 2021-03-12 RX ADMIN — INSULIN ASPART SCH UNIT: 100 INJECTION, SOLUTION INTRAVENOUS; SUBCUTANEOUS at 20:52

## 2021-03-12 RX ADMIN — ALBUTEROL SULFATE SCH PUFF: 90 AEROSOL, METERED RESPIRATORY (INHALATION) at 15:39

## 2021-03-12 RX ADMIN — CEFAZOLIN SCH MLS/HR: 330 INJECTION, POWDER, FOR SOLUTION INTRAMUSCULAR; INTRAVENOUS at 20:51

## 2021-03-12 RX ADMIN — CEFAZOLIN SCH MLS/HR: 330 INJECTION, POWDER, FOR SOLUTION INTRAMUSCULAR; INTRAVENOUS at 12:31

## 2021-03-12 RX ADMIN — PIPERACILLIN AND TAZOBACTAM SCH MLS/HR: 3; .375 INJECTION, POWDER, FOR SOLUTION INTRAVENOUS at 09:04

## 2021-03-12 RX ADMIN — INSULIN ASPART SCH UNIT: 100 INJECTION, SOLUTION INTRAVENOUS; SUBCUTANEOUS at 20:51

## 2021-03-12 RX ADMIN — Medication SCH MG: at 21:46

## 2021-03-12 RX ADMIN — ENOXAPARIN SODIUM SCH MG: 60 INJECTION SUBCUTANEOUS at 20:51

## 2021-03-12 RX ADMIN — PIPERACILLIN AND TAZOBACTAM SCH MLS/HR: 3; .375 INJECTION, POWDER, FOR SOLUTION INTRAVENOUS at 03:02

## 2021-03-12 RX ADMIN — ALBUTEROL SULFATE SCH PUFF: 90 AEROSOL, METERED RESPIRATORY (INHALATION) at 07:27

## 2021-03-12 RX ADMIN — DEXTROSE SCH MG: 50 INJECTION, SOLUTION INTRAVENOUS at 08:38

## 2021-03-12 RX ADMIN — DEXTROSE SCH MG: 50 INJECTION, SOLUTION INTRAVENOUS at 20:51

## 2021-03-12 RX ADMIN — INSULIN ASPART SCH UNIT: 100 INJECTION, SOLUTION INTRAVENOUS; SUBCUTANEOUS at 12:29

## 2021-03-12 RX ADMIN — INSULIN ASPART SCH UNIT: 100 INJECTION, SOLUTION INTRAVENOUS; SUBCUTANEOUS at 05:43

## 2021-03-12 RX ADMIN — Medication SCH MG: at 08:38

## 2021-03-12 RX ADMIN — INSULIN ASPART SCH UNIT: 100 INJECTION, SOLUTION INTRAVENOUS; SUBCUTANEOUS at 05:42

## 2021-03-12 RX ADMIN — PANTOPRAZOLE SODIUM SCH MG: 40 INJECTION, POWDER, FOR SOLUTION INTRAVENOUS at 08:38

## 2021-03-12 RX ADMIN — INSULIN ASPART SCH UNIT: 100 INJECTION, SOLUTION INTRAVENOUS; SUBCUTANEOUS at 17:30

## 2021-03-12 RX ADMIN — NOREPINEPHRINE BITARTRATE SCH: 1 INJECTION, SOLUTION, CONCENTRATE INTRAVENOUS at 14:57

## 2021-03-12 RX ADMIN — PIPERACILLIN AND TAZOBACTAM SCH MLS/HR: 3; .375 INJECTION, POWDER, FOR SOLUTION INTRAVENOUS at 17:31

## 2021-03-12 NOTE — P.PN
Subjective


Progress Note Date: 03/12/21


Principal diagnosis: 





Acute hypoxic respiratory failure secondary to Covid 19 pneumonitis.








this 81-year-old female patient, known history of diabetes and hypertension, was

brought into the emergency department upon the request of her family because of 

generalized weakness, falls and confusion x6 days.  Her condition declined and 

the patient decompensated over the past 48-72 hours.  This was rather a quick 

deterioration in her condition.  She had been having also shortness of breath 

for the past 2 days..  In the emergency department, the initial vitals showed 

that the patient was profoundly hypoxic with a pulse ox of 50%.  At that point, 

the patient also had blood work that showed severe lactic acidosis with a lactic

acid level of 11.  She wasgiven a chest x-ray that showed diffuse bilateral 

pulmonary infiltrates.  The patient was given a bolus of 1 L of IV fluid and 

following that she was moved to the intensive care unit.  The patient was kept 

on BiPAP throughout the night and currently she is on a BiPAP pressure of 14/7 

cm of water with an FiO2 of 100%.  Her FiO2 was 85%.  She is responsive, however

she is quite tachypneic and she is breathing in the mid 30s and she is 

struggling with her breathing and earlier this morning she started getting more 

restless and agitated.  We had to start on Precedex which is currently running 

at 0.4 mcg/kg/h.  She seems to be much more comfortable while on Precedex.had a 

bout of atrial fibrillation with rapid ventricular response in the ICU.  The 

heart rate went up to 170 range.  She was given 20 mg of Cardizem IV push.  She 

converted back to normal sinus rhythm.  She became hypotensive and her systolic 

blood pressure in the mid 60s.  She was given additional 2 L of IV fluids and 

the patient is currently on normal saline running at 30 mL an hour.  She will be

also started on norepinephrine infusion.  Lactic acid level is down to 6 based 

on the follow-up blood work.the blood gas showed a pH of 7.29 with a pCO2 of 37 

and pO2 of 80.  This was done a bipolar or 14/7 with an FiO2 of 100%.  Chest x-

ray showing diffuse breath and pulmonary infiltrates.  D-dimer is at 1.09.  The 

patient has a LDH of 1724, CRP level is 77, troponin is at 0.118, the proBNP 

level is 330, the patient has a lactic acid level of 11.4 at time of admission 

her lactic acid level is down to 6.7 and she has an anion gap metabolic acidosis

with a serum bicarb of 14 and a gap of 19.  UA is positive for glucose, +4, and 

COVID19 testing was positive.  As such, her presentation is consistent with 

COVID 19 pneumonia.








03/07/2021, the patient remains intubated on a mechanical ventilator.  The 

patient was intubated yesterday for an acute hypoxic respiratory failure and she

was placed on mechanical ventilator she is currently off of a running at 40 

mcg/kg per minute.  The patient is on no paralytics.  She is quite successful 

mechanical ventilator.  She is an assist-control mode at the rate of 30 with a 

tidal volume of 375 and FiO2 of 60% with a PEEP of this.  The chest x-ray from 

today is showing bilateral perihilar and lower lobe in addition to right upper 

lobe pulmonary rate.  ET tube is in a good location.  It is seen around 2 cm 

above the dasia.  The patient also has a left internal jugular.  Some today 

showed a pH of 7.18 with a pCO2 of 46 and pO2 of 108 and this wasn't an FiO2 of 

60%.  Meanwhile, the patient was quite hypotensive and acidotic and the patient 

had a very high lactic acid level of 11.4 which dropped down to 4.6 with fluid 

resuscitation.  Overall, the patient received a total of 3 L of IV fluid in the 

form of normal saline and the patient is currently running at 130s's an hour of 

normal saline.  Urine output is in order of 30-40 mL an hour and the net fluid 

balance is been +4.8 L over the past 24 hours.  As such the patient is well 

resuscitated.  Most recent blood pressure is 95/44 and the patient is currently 

on pressors at 19 mcg/ per minute of norepinephrine infusion.  The procalcitonin

level was at 0.53 and blood cultures and sputum cultures were sent and the 

patient was covered with empiric antibiotics and the patient was given cefepime 

and Levaquin.  White cell count today's at 24.  She was given Decadron.  She was

given convalescent plasma one unit..  The renal function is stable.  E

lectrolytes are stable.  Potassium level is at 5.6 and this needs to be 

repeated.  Serum bicarb is at 17 which is improving.  Less lactic acid level 

from yesterday was at 4.6.  The patient's blood sugar is at 160 and she is on 

sliding scale coverage for blood sugar control.  Cardiac rhythm is sinus.  No 

further bouts of atrial fibrillation.  She did have one a 1 bouts yesterday 

prior to intubation and she converted back into normal sinus rhythm without any 

major difficulties.





Patient was reevaluated today on 3/8/2021, remains in the ICU, intubated and 

mechanically ventilated.  Patient is on volume control plus, tidal volume is 

450, rate is 24, FiO2 is 60%, PEEP is at 15.  ABG showed a pO2 of 72 pCO2 of 37 

pH of 7.30.  Patient is on propofol at 30 mcg/kg/m, off norepinephrine, IV fluid

is at 13 0 mL per hour.  Patient is on enteral feeding patient received 1 unit 

of convalescent plasma she was out of the window for remdesivir, she is on 

Decadron 20 mg daily, and she is also on Lovenox 40 mg subcu daily.  Today, 

after reviewing her ABG and reviewed her chest x-ray, I felt there is no need to

make any major changes in her ventilator settings.  Hence no changes were made, 

and we will plan to continue present supportive care measures.  Patient remains 

empirically on antibiotics.  Her bicarb is improving not requiring any bicarb 

drip.  She remains on sliding scale coverage for blood sugar.  Presently in 

sinus rhythm, no further episodes of atrial fibrillation.  Chest x-ray continues

to show bilateral multifocal infiltrates with cardiomegaly.  CBC is relatively 

normal.  D-dimer is 1.77.  Basic metabolic profile is normal.  Renal profile is 

normal.  LDH is 1814 and C-reactive protein is 76.3.  Pro-calcitonin is elevated

at 0.74.  Levaquin and Zosyn and remain on board.





Reevaluated today on 3/9/2021, patient remains in the ICU, intubated and 

mechanically ventilated.  She is on assist control rate of 24 tidal volume is 

450 FiO2 is 55% PEEP is 15.  ABG showed a pO2 of 76 pCO2 of 42 pH of 7.26.  Has 

patient was placed on oral bicarb.  Patient is on propofol at 56 mcg/kg/m, 

norepinephrine at 0.03 mcg/kg/m, she is on tube feeding, patient will receive 1 

L of fluid bolus in the formal 0.9 normal saline, and hopefully wean 

norepinephrine down and possibly discontinue.  Chest x-ray continues to show 

bilateral infiltrates.  And again her ABG is marginal on 60% FiO2 her pO2 was 

only 76.  FiO2 was decreased down to 55%.  No major change over the last 24 

hours, patient is basically about the same.  CBC is relatively normal.  D-dimer 

6.82.  Basic metabolic profile is normal.  Renal profile is normal.  LDH is 1473

and C-reactive protein is 47.3.  Patient remains on the Covid 19 cocktails.  

Remains on Decadron 20 mg IV push daily, remains on Zosyn and Levaquin.  Sputum 

cultures are negative.


Hence we will discontinue Levaquin and continue Zosyn





Patient was reevaluated today on 3/10/2021, remains in the ICU, intubated and 

mechanically ventilated.  Patient was initially intubated on 3/6/2021.  Patient 

remains on high PEEP at 15, thyroglobulin is 450, patient is on volume control 

plus, inspiratory time is 0.8 seconds, FiO2 is 55% and PEEP is at 15.  ABG 

showed a pO2 of 69 pCO2 42 pH of 7.27, hence cut down her FiO2 to 50%, and kept 

her on the same ventilator settings.  Patient is on propofol at 55 mcg/kg/m, off

norepinephrine and her IV fluids remains at 1 50 mL per hour.  Chest x-ray 

continues to show bilateral infiltrates, basically the same, does not seem to be

any better or any worse.  Her d-dimer is significantly high today, over 35, 

hence we increased her Lovenox 50 mg subcu twice a day.  Her peak airway 

pressure is 35, plateau pressure is 32.  Urine output is about 40 mL per hour.  

Basic metabolic profile today is normal.  Bicarb remains a bit low at 20.  Renal

profile is normal.  CBC is relatively normal.  Inflammatory markers remain 

elevated LDH is 1476 and C-reactive protein is 29.7.





Patient was reevaluated today on 3/11/2021, remains in ICU, intubated and 

mechanically ventilated.  Her ventilator settings are volume control plus at 

450, FiO2 is 65% PEEP is 15 and the rate is 24.  ABG remains marginal with a pO2

of 60 pCO2 of 44 pH of 7.28.  Remains on propofol not requiring any other 

sedation at this point.  Remains on enteral feeding via orogastric tube, she is 

up to goal.  Chest x-ray is basically about the same continues to show bilateral

interstitial infiltrates.  It is not any better and not any worse.  Patient is 

hemodynamically stable, not requiring any pressors.  Her d-dimer is 34  hence 

the Lovenox dose was increased.  Her C-reactive protein is 15.1 [place]DH [place] [address]

labs today were reviewed, chest x-ray was reviewed and I updated her daughter on

her condition.  The family seems to be inclined to consider comfort care 

measures in the meantime the CODE STATUS was changed to DO NOT RESUSCITATE CODE 

STATUS.  Daughter was made aware that her condition has not shown any 

improvement since the day she was intubated on 3/6/2021.  I believe her overall 

clinical status is unchanged.





Patient was reevaluated today on 3/12/2021, remains in the ICU, intubated and 

mechanically ventilated.  Patient is on assist control rate of 24, volume 

control plus at 450 FiO2 65% PEEP of 15.  ABG remains marginal in spite of high 

FiO2 and high PEEP, pO2 is only 63 pCO2 is 43 pH of 7.31.  Chest x-ray continues

to show bilateral interstitial infiltrates, not much of a change noted in the 

last 1 week.  Patient remains on propofol at 20 mcg/kg/m she is also on IV fluid

at 1 20 mL per hour.  On enteral feeding vital HP.  Peak airway pressure is in 

the high 30s and plateau pressure is 36.  CBC showed WBC count of 7.8 hemoglobin

is 12.5, platelets are low at 41,000 patient is on Lovenox because of 

significantly elevated d-dimer.  Electrolytes showed slightly elevated sodium of

145 otherwise electrolytes are normal.  Renal profile is normal.  LDH is 1832, 

seems to be worsening.  and C-reactive protein is 10.3, noted to be a bit better

today.  D-dimer remains elevated at greater than 34.





Objective





- Vital Signs


Vital signs: 


                                   Vital Signs











Temp  97.9 F   03/12/21 08:00


 


Pulse  77   03/12/21 10:00


 


Resp  24   03/12/21 10:00


 


BP  134/57   03/12/21 10:00


 


Pulse Ox  93 L  03/12/21 10:00








                                 Intake & Output











 03/11/21 03/12/21 03/12/21





 18:59 06:59 18:59


 


Intake Total 2176.260 2419.784 837.736


 


Output Total 680 745 160


 


Balance 7830.847 4432.784 677.736


 


Weight  97 kg 97 kg


 


Intake:   


 


  IV 1732 1832 644


 


    Piperacillin-Tazobactam 3 100 200 100





    .375 gm In Sodium   





    Chloride 0.9% 100 ml @ 25   





    mls/hr IVPB Q8H [facility] Rx#:   





    [id number]   


 


    Pressure Bag 72 72 24


 


    Sodium Chloride 0.9% 1, 1560 1560 520





    000 ml @ 130 mls/hr IV .   





    Q7H42M [facility] Rx#:[id number]   


 


  Intake, IV Titration 132.260 161.784 79.736





  Amount   


 


    propofoL 1,000 mg In 132.260 161.784 79.736





    Empty Bag 1 bag @ Titrate   





    IV .Q0M [facility] Rx#:   





    [id number]   


 


  Tube Feeding 252 336 84


 


  Other 60 90 30


 


Output:   


 


  Urine 680 745 160


 


Other:   


 


  Voiding Method Indwelling Catheter Indwelling Catheter 








                       ABP, PAP, CO, CI - Last Documented











Arterial Blood Pressure        160/51

















- Exam





Physical Exam: Revealed a 81-year-old female in no distress, ventilated, 

sedated, comfortable.


Head: Atraumatic, normocephalic


HEENT:[Neck is supple.] [No neck masses.] [No thyromegaly.] [No JVD.] endo 

tracheal tube and orogastric tubes are intact.


Chest: [Clear throughout, minimal crackles at the bases no rhonchi and no 

wheezes.


Cardiac Exam: [Normal S1 and S2, no S3 gallop, no murmur.]


Abdomen: [Soft, nontender,  no megaly, no rebound, no guarding, normal bowel 

sounds.]


Extremities: [No clubbing, no edema, no cyanosis.]


Neurological Exam: Could not assess, patient is sedated and maintained on 

propofol.  She is calm and comfortable, pupils are equally reactive to light.  

With holding sedation will be done today for assessment of mental status.


Psychiatric: Could not be assessed.


Skin: No rashes.





- Labs


CBC & Chem 7: 


                                 03/12/21 04:45





                                 03/12/21 04:45


Labs: 


                  Abnormal Lab Results - Last 24 Hours (Table)











  03/11/21 03/11/21 03/11/21 Range/Units





  14:39 17:14 17:23 


 


Plt Count     (150-450)  k/uL


 


Lymphocytes # (Manual)     (1.0-4.8)  k/uL


 


Myelocytes # (Manual)     (0)  k/uL


 


Nucleated RBCs     (0-0)  /100 WBC


 


D-Dimer     (<0.60)  mg/L FEU


 


ABG pH     (7.35-7.45)  


 


ABG pO2     ()  mmHg


 


ABG O2 Saturation     (94-97)  %


 


Chloride     ()  mmol/L


 


BUN     (7-17)  mg/dL


 


Glucose     (74-99)  mg/dL


 


POC Glucose (mg/dL)  200 H  251 H  248 H  (75-99)  mg/dL


 


Calcium     (8.4-10.2)  mg/dL


 


AST     (14-36)  U/L


 


ALT     (4-34)  U/L


 


Lactate Dehydrogenase     (313-618)  U/L


 


Creatine Kinase     ()  U/L


 


C-Reactive Protein     (<10.0)  mg/L


 


Total Protein     (6.3-8.2)  g/dL


 


Albumin     (3.5-5.0)  g/dL














  03/11/21 03/11/21 03/12/21 Range/Units





  20:45 23:43 02:55 


 


Plt Count     (150-450)  k/uL


 


Lymphocytes # (Manual)     (1.0-4.8)  k/uL


 


Myelocytes # (Manual)     (0)  k/uL


 


Nucleated RBCs     (0-0)  /100 WBC


 


D-Dimer     (<0.60)  mg/L FEU


 


ABG pH     (7.35-7.45)  


 


ABG pO2     ()  mmHg


 


ABG O2 Saturation     (94-97)  %


 


Chloride     ()  mmol/L


 


BUN     (7-17)  mg/dL


 


Glucose     (74-99)  mg/dL


 


POC Glucose (mg/dL)  256 H  174 H  173 H  (75-99)  mg/dL


 


Calcium     (8.4-10.2)  mg/dL


 


AST     (14-36)  U/L


 


ALT     (4-34)  U/L


 


Lactate Dehydrogenase     (313-618)  U/L


 


Creatine Kinase     ()  U/L


 


C-Reactive Protein     (<10.0)  mg/L


 


Total Protein     (6.3-8.2)  g/dL


 


Albumin     (3.5-5.0)  g/dL














  03/12/21 03/12/21 03/12/21 Range/Units





  04:45 04:45 04:45 


 


Plt Count    41 L  (150-450)  k/uL


 


Lymphocytes # (Manual)    0.16 L  (1.0-4.8)  k/uL


 


Myelocytes # (Manual)    0.23 H  (0)  k/uL


 


Nucleated RBCs    2 H  (0-0)  /100 WBC


 


D-Dimer  >34.10 H    (<0.60)  mg/L FEU


 


ABG pH     (7.35-7.45)  


 


ABG pO2     ()  mmHg


 


ABG O2 Saturation     (94-97)  %


 


Chloride   123 H   ()  mmol/L


 


BUN   36 H   (7-17)  mg/dL


 


Glucose   206 H   (74-99)  mg/dL


 


POC Glucose (mg/dL)     (75-99)  mg/dL


 


Calcium   8.2 L   (8.4-10.2)  mg/dL


 


AST   56 H   (14-36)  U/L


 


ALT   44 H   (4-34)  U/L


 


Lactate Dehydrogenase   1832 H   (313-618)  U/L


 


Creatine Kinase     ()  U/L


 


C-Reactive Protein   10.3 H   (<10.0)  mg/L


 


Total Protein   5.0 L   (6.3-8.2)  g/dL


 


Albumin   1.9 L   (3.5-5.0)  g/dL














  03/12/21 03/12/21 03/12/21 Range/Units





  04:45 05:38 05:42 


 


Plt Count     (150-450)  k/uL


 


Lymphocytes # (Manual)     (1.0-4.8)  k/uL


 


Myelocytes # (Manual)     (0)  k/uL


 


Nucleated RBCs     (0-0)  /100 WBC


 


D-Dimer     (<0.60)  mg/L FEU


 


ABG pH    7.31 L  (7.35-7.45)  


 


ABG pO2    63 L  ()  mmHg


 


ABG O2 Saturation    92.0 L  (94-97)  %


 


Chloride     ()  mmol/L


 


BUN     (7-17)  mg/dL


 


Glucose     (74-99)  mg/dL


 


POC Glucose (mg/dL)   163 H   (75-99)  mg/dL


 


Calcium     (8.4-10.2)  mg/dL


 


AST     (14-36)  U/L


 


ALT     (4-34)  U/L


 


Lactate Dehydrogenase     (313-618)  U/L


 


Creatine Kinase  26 L    ()  U/L


 


C-Reactive Protein     (<10.0)  mg/L


 


Total Protein     (6.3-8.2)  g/dL


 


Albumin     (3.5-5.0)  g/dL














  03/12/21 03/12/21 Range/Units





  08:32 11:59 


 


Plt Count    (150-450)  k/uL


 


Lymphocytes # (Manual)    (1.0-4.8)  k/uL


 


Myelocytes # (Manual)    (0)  k/uL


 


Nucleated RBCs    (0-0)  /100 WBC


 


D-Dimer    (<0.60)  mg/L FEU


 


ABG pH    (7.35-7.45)  


 


ABG pO2    ()  mmHg


 


ABG O2 Saturation    (94-97)  %


 


Chloride    ()  mmol/L


 


BUN    (7-17)  mg/dL


 


Glucose    (74-99)  mg/dL


 


POC Glucose (mg/dL)  155 H  209 H  (75-99)  mg/dL


 


Calcium    (8.4-10.2)  mg/dL


 


AST    (14-36)  U/L


 


ALT    (4-34)  U/L


 


Lactate Dehydrogenase    (313-618)  U/L


 


Creatine Kinase    ()  U/L


 


C-Reactive Protein    (<10.0)  mg/L


 


Total Protein    (6.3-8.2)  g/dL


 


Albumin    (3.5-5.0)  g/dL








                      Microbiology - Last 24 Hours (Table)











 03/06/21 16:43 Blood Culture - Preliminary





 Blood    No Growth after 120 hours














Assessment and Plan


Assessment: 





Impression:


Acute hypoxic respiratory failure secondary to covid 19 pneumonitis.  Also 

suspect ARDS.


Lymphopenia with thrombocytopenia secondary to above.


Elevated inflammatory markers secondary to Covid 19 pneumonitis.


Type 2 diabetes.


Benign essential hypertension.


Paroxysmal atrial fibrillation.


Hypovolemic hypotension,, responding to fluids mostly.


Elevated d-dimer secondary to above.





Recommendation:


Continue ventilatory support.  No changes were made on the ventilator today.


Continue Lovenox to 50 mg subcu twice a day.


Continue Covid 19 cocktail.


Continue Decadron.  6 mg IV push every 12 hours.


Patient was not a candidate for remdesivir and anti-interleukin-6.


Patient received 2 units of convalescent plasma.


Continue nutritional support. 


Continue GI and DVT prophylaxis.


Remains critically ill and prognosis is poor


Family is considering possibly comfort care measures.


Critical care time over 30 minutes  We'll continue to follow.








Time with Patient: Greater than 30

## 2021-03-12 NOTE — P.PN
Subjective


Progress Note Date: 03/12/21








Treasure Wheeler, is a 81-year-old female patient of Dr. Falcon, who 

presented to [facility] emergency room with a chief complaint 

of worsening shortness of breath generalized weakness and confusion, symptoms 

started about 6 days prior to admission but her condition declined significantly

in the last 2 days.  Patient was evaluated in emergency room vital examination 

on presentation revealed a temperature of 98.4 pulse 133 respiration 30 blood 

pressure 135/67 pulse ox 55% on room air her white blood count was 3.8 

hemoglobin 15.9 platelet count 99 sodium 133 potassium 4.5 plasma lactic acid 

11.4 BUN 17 creatinine 0.9 arterial blood gases revealed a pH of 7.19 pCO2 49 by

mouth to 59 .  Chest x-ray done in the emergency room revealed moderate 

pulmonary edema that could relate to congestive heart failure or ARDS , EKG 

revealed sinus tachycardia with right bundle branch block , coronavirus PCR was 

positive.


patient was admitted to intensive care unit she was started on BiPAP.  Patient 

developed atrial fibrillation with rapid ventricular response with a heart rate 

of 170 she was started on Cardizem drip, she had episodes of hypotension, she 

was started on levophed drip, and earlier this morning she required intubation 

and mechanical ventilation.


Her past medical history is significant for history of hypertension, history of 

hyperlipidemia, history of insulin-dependent diabetes mellitus, and history of 

osteoporosis





On 03/07/2021 patient was seen and examined in the ICU she is intubated sedated 

maintained on mechanical ventilation she is on assist control rate of 30 FiO2 

60% and a PEEP of 15.  PH is 7.18 pCO2 46 by mouth 02/01/2008 lactic acid is 

down to 4.6 blood pressure 100/52 she is still maintained on levophed infusion, 

she is maintained on IV Decadron she received 1 unit of convalescent plasma, 

white blood count 24.6 hemoglobin 14.1 platelet count 181 sodium 136 potassium 

5.6 chloride 110 CO2 17 BUN 28 creatinine 1.2 glucose level 165





On 03/08/2021 patient was seen and examined in the ICU she is intubated sedated 

maintained on mechanical ventilation, today she was taken off of vasopressors 

and has been tolerating well blood pressure at this time 98/55, pH is 7.30 pCO2 

37 by mouth to 72 white blood count is down to 6.6 hemoglobin 12.6 platelet cou

nt 107 d-dimer is 1.77 BUN 34 creatinine 0.96





On 03/09/2021 patient was seen and examined in the ICU she remains intubated 

sedated maintained on mechanical ventilation FiO2 55% rate of 24 and PEEP of 15 

temperature is 97.5 pulse 79 respiration 24 blood pressure 127/58 pulse ox 91% 

on FiO2 55% mechanical ventilation d-dimer 6.8 to pH 7.26 pCO2 42 by mouth to 76

white blood count is 5.8 hemoglobin 12.6 platelet count 102 BUN 37 creatinine 

0.96 liver enzymes are elevated with AST at 71 a LT at 38





On 03/10/2021 patient was seen and examined in the ICU she is intubated sedated 

maintained on mechanical ventilation FiO2 55% rate of 24 and PEEP of 15 her 

vital examination reveals a temperature of 97.5 pulse 75 respiration 24 blood 

pressure 103/46 pulse ox 91% arterial blood gas reveals a pH of 7.27 pCO2 42 pO2

69


White blood count is 6.9 hemoglobin 12.8 platelet count 71 sodium 141 potassium 

4.7 chloride 120 CO2 20 BUN 33 creatinine 0.73 patient is not on any IV pressors

at this time.





On 03/11/2021 patient was seen and examined in the ICU she is intubated sedated 

maintained on mechanical ventilation, she is on FiO2 of 65% rate 24 PEEP of 15, 

temperature 97.6 pulse 51 respiration 24 blood pressure 131/43 pulse ox 94% ABG 

reveals a pH of 7.28 pCO2 44 PO2 60 white blood count is 7.2 hemoglobin 12.4 

platelet count 41 sodium 144 potassium 4.8 chloride 120 glucose level 242 at 

this time will change Accu-Chek and coverage to every 3 hours.





On 03/12/2021 patient was seen and examined in the ICU she is intubated sedated 

maintained on mechanical ventilation, case was discussed in details with Dr. Ruiz pulmonologist, patient is stable however there is no significant 

improvement for the last several days despite maximum medical therapy she is 

still requiring high oxygen requirement no significant improvement in chest x-

ray today her vital exam reveals a temperature of 97.9 pulse 59 respiration 24 

blood pressure 127/78 pulse ox 91% on mechanical ventilation FiO2 65% assist 

control rate of 24 and PEEP of 15 ABG reveals pH 7.31 pCO2 43 PO2 63





Objective





- Vital Signs


Vital signs: 


                                   Vital Signs











Temp  97.9 F   03/12/21 08:00


 


Pulse  77   03/12/21 10:00


 


Resp  24   03/12/21 10:00


 


BP  134/57   03/12/21 10:00


 


Pulse Ox  93 L  03/12/21 10:00








                                 Intake & Output











 03/11/21 03/12/21 03/12/21





 18:59 06:59 18:59


 


Intake Total 2176.260 2419.784 758


 


Output Total 680 745 160


 


Balance 7116.761 5348.784 598


 


Weight  97 kg 97 kg


 


Intake:   


 


  IV 1732 1832 644


 


    Piperacillin-Tazobactam 3 100 200 100





    .375 gm In Sodium   





    Chloride 0.9% 100 ml @ 25   





    mls/hr IVPB Q8H [facility] Rx#:   





    [id number]   


 


    Pressure Bag 72 72 24


 


    Sodium Chloride 0.9% 1, 1560 1560 520





    000 ml @ 130 mls/hr IV .   





    Q7H42M [facility] Rx#:[id number]   


 


  Intake, IV Titration 132.260 161.784 





  Amount   


 


    propofoL 1,000 mg In 132.260 161.784 





    Empty Bag 1 bag @ Titrate   





    IV .Q0M [facility] Rx#:   





    [id number]   


 


  Tube Feeding 252 336 84


 


  Other 60 90 30


 


Output:   


 


  Urine 680 745 160


 


Other:   


 


  Voiding Method Indwelling Catheter Indwelling Catheter 








                       ABP, PAP, CO, CI - Last Documented











Arterial Blood Pressure        160/51

















- Exam








Patient is intubated sedated maintained on mechanical ventilation


HEENT head normocephalic and atraumatic


Neck is supple no JVD no goiter no lymphadenopathy


Chest exam reveals a few scattered rhonchi no wheezing


Cardiac exam reveals regular heart sounds no gallops no murmurs


Abdomen is soft nontender no organomegaly with normal bowel sounds


Extremity exam reveals no edema no cyanosis or clubbing








- Labs


CBC & Chem 7: 


                                 03/12/21 04:45





                                 03/12/21 04:45


Labs: 


                  Abnormal Lab Results - Last 24 Hours (Table)











  03/11/21 03/11/21 03/11/21 Range/Units





  11:56 14:39 17:14 


 


Plt Count     (150-450)  k/uL


 


Lymphocytes # (Manual)     (1.0-4.8)  k/uL


 


Myelocytes # (Manual)     (0)  k/uL


 


Nucleated RBCs     (0-0)  /100 WBC


 


D-Dimer     (<0.60)  mg/L FEU


 


ABG pH     (7.35-7.45)  


 


ABG pO2     ()  mmHg


 


ABG O2 Saturation     (94-97)  %


 


Chloride     ()  mmol/L


 


BUN     (7-17)  mg/dL


 


Glucose     (74-99)  mg/dL


 


POC Glucose (mg/dL)  214 H  200 H  251 H  (75-99)  mg/dL


 


Calcium     (8.4-10.2)  mg/dL


 


AST     (14-36)  U/L


 


ALT     (4-34)  U/L


 


Lactate Dehydrogenase     (313-618)  U/L


 


Creatine Kinase     ()  U/L


 


C-Reactive Protein     (<10.0)  mg/L


 


Total Protein     (6.3-8.2)  g/dL


 


Albumin     (3.5-5.0)  g/dL














  03/11/21 03/11/21 03/11/21 Range/Units





  17:23 20:45 23:43 


 


Plt Count     (150-450)  k/uL


 


Lymphocytes # (Manual)     (1.0-4.8)  k/uL


 


Myelocytes # (Manual)     (0)  k/uL


 


Nucleated RBCs     (0-0)  /100 WBC


 


D-Dimer     (<0.60)  mg/L FEU


 


ABG pH     (7.35-7.45)  


 


ABG pO2     ()  mmHg


 


ABG O2 Saturation     (94-97)  %


 


Chloride     ()  mmol/L


 


BUN     (7-17)  mg/dL


 


Glucose     (74-99)  mg/dL


 


POC Glucose (mg/dL)  248 H  256 H  174 H  (75-99)  mg/dL


 


Calcium     (8.4-10.2)  mg/dL


 


AST     (14-36)  U/L


 


ALT     (4-34)  U/L


 


Lactate Dehydrogenase     (313-618)  U/L


 


Creatine Kinase     ()  U/L


 


C-Reactive Protein     (<10.0)  mg/L


 


Total Protein     (6.3-8.2)  g/dL


 


Albumin     (3.5-5.0)  g/dL














  03/12/21 03/12/21 03/12/21 Range/Units





  02:55 04:45 04:45 


 


Plt Count     (150-450)  k/uL


 


Lymphocytes # (Manual)     (1.0-4.8)  k/uL


 


Myelocytes # (Manual)     (0)  k/uL


 


Nucleated RBCs     (0-0)  /100 WBC


 


D-Dimer   >34.10 H   (<0.60)  mg/L FEU


 


ABG pH     (7.35-7.45)  


 


ABG pO2     ()  mmHg


 


ABG O2 Saturation     (94-97)  %


 


Chloride    123 H  ()  mmol/L


 


BUN    36 H  (7-17)  mg/dL


 


Glucose    206 H  (74-99)  mg/dL


 


POC Glucose (mg/dL)  173 H    (75-99)  mg/dL


 


Calcium    8.2 L  (8.4-10.2)  mg/dL


 


AST    56 H  (14-36)  U/L


 


ALT    44 H  (4-34)  U/L


 


Lactate Dehydrogenase    1832 H  (313-618)  U/L


 


Creatine Kinase     ()  U/L


 


C-Reactive Protein    10.3 H  (<10.0)  mg/L


 


Total Protein    5.0 L  (6.3-8.2)  g/dL


 


Albumin    1.9 L  (3.5-5.0)  g/dL














  03/12/21 03/12/21 03/12/21 Range/Units





  04:45 04:45 05:38 


 


Plt Count  41 L    (150-450)  k/uL


 


Lymphocytes # (Manual)  0.16 L    (1.0-4.8)  k/uL


 


Myelocytes # (Manual)  0.23 H    (0)  k/uL


 


Nucleated RBCs  2 H    (0-0)  /100 WBC


 


D-Dimer     (<0.60)  mg/L FEU


 


ABG pH     (7.35-7.45)  


 


ABG pO2     ()  mmHg


 


ABG O2 Saturation     (94-97)  %


 


Chloride     ()  mmol/L


 


BUN     (7-17)  mg/dL


 


Glucose     (74-99)  mg/dL


 


POC Glucose (mg/dL)    163 H  (75-99)  mg/dL


 


Calcium     (8.4-10.2)  mg/dL


 


AST     (14-36)  U/L


 


ALT     (4-34)  U/L


 


Lactate Dehydrogenase     (313-618)  U/L


 


Creatine Kinase   26 L   ()  U/L


 


C-Reactive Protein     (<10.0)  mg/L


 


Total Protein     (6.3-8.2)  g/dL


 


Albumin     (3.5-5.0)  g/dL














  03/12/21 03/12/21 Range/Units





  05:42 08:32 


 


Plt Count    (150-450)  k/uL


 


Lymphocytes # (Manual)    (1.0-4.8)  k/uL


 


Myelocytes # (Manual)    (0)  k/uL


 


Nucleated RBCs    (0-0)  /100 WBC


 


D-Dimer    (<0.60)  mg/L FEU


 


ABG pH  7.31 L   (7.35-7.45)  


 


ABG pO2  63 L   ()  mmHg


 


ABG O2 Saturation  92.0 L   (94-97)  %


 


Chloride    ()  mmol/L


 


BUN    (7-17)  mg/dL


 


Glucose    (74-99)  mg/dL


 


POC Glucose (mg/dL)   155 H  (75-99)  mg/dL


 


Calcium    (8.4-10.2)  mg/dL


 


AST    (14-36)  U/L


 


ALT    (4-34)  U/L


 


Lactate Dehydrogenase    (313-618)  U/L


 


Creatine Kinase    ()  U/L


 


C-Reactive Protein    (<10.0)  mg/L


 


Total Protein    (6.3-8.2)  g/dL


 


Albumin    (3.5-5.0)  g/dL








                      Microbiology - Last 24 Hours (Table)











 03/06/21 16:43 Blood Culture - Preliminary





 Blood    No Growth after 120 hours














Assessment and Plan


Plan: 





1. Acute hypoxic respiratory failure, requiring intubation and mechanical 

ventilation





2. Poitive Covid 19 testing





3. bilateral pulmonary infiltrates, likely related to pneumonia due to Covid 19 

virus





4.  Episode of atrial fibrillation with rapid ventricular response





5. Acute lactic acidosis improving





6. Episode of hypotension improved with IV fluid and levophed drip





7. Underlying history of hypertension





8. Underlying history of hyperlipidemia





9. Underlying history of diabetes mellitus





Currently patient is intubated sedated maintained on mechanical ventilation


She is maintained on IV Decadron and subcu Lovenox


Will follow closely prognosis is guarded due to age and severity of illness

## 2021-03-13 VITALS — TEMPERATURE: 98.1 F | DIASTOLIC BLOOD PRESSURE: 56 MMHG | SYSTOLIC BLOOD PRESSURE: 173 MMHG

## 2021-03-13 VITALS — HEART RATE: 51 BPM

## 2021-03-13 LAB
GLUCOSE BLD-MCNC: 151 MG/DL (ref 75–99)
GLUCOSE BLD-MCNC: 162 MG/DL (ref 75–99)
GLUCOSE BLD-MCNC: 169 MG/DL (ref 75–99)
GLUCOSE BLD-MCNC: 179 MG/DL (ref 75–99)

## 2021-03-13 RX ADMIN — MORPHINE SULFATE PRN MG: 4 INJECTION, SOLUTION INTRAMUSCULAR; INTRAVENOUS at 16:43

## 2021-03-13 RX ADMIN — CEFAZOLIN SCH MLS/HR: 330 INJECTION, POWDER, FOR SOLUTION INTRAMUSCULAR; INTRAVENOUS at 02:49

## 2021-03-13 RX ADMIN — Medication SCH MG: at 09:51

## 2021-03-13 RX ADMIN — ALBUTEROL SULFATE SCH PUFF: 90 AEROSOL, METERED RESPIRATORY (INHALATION) at 07:11

## 2021-03-13 RX ADMIN — Medication SCH MCG: at 09:51

## 2021-03-13 RX ADMIN — ENOXAPARIN SODIUM SCH MG: 60 INJECTION SUBCUTANEOUS at 09:50

## 2021-03-13 RX ADMIN — INSULIN ASPART SCH UNIT: 100 INJECTION, SOLUTION INTRAVENOUS; SUBCUTANEOUS at 12:59

## 2021-03-13 RX ADMIN — MORPHINE SULFATE PRN MG: 4 INJECTION, SOLUTION INTRAMUSCULAR; INTRAVENOUS at 09:50

## 2021-03-13 RX ADMIN — MORPHINE SULFATE PRN MG: 4 INJECTION, SOLUTION INTRAMUSCULAR; INTRAVENOUS at 00:43

## 2021-03-13 RX ADMIN — PIPERACILLIN AND TAZOBACTAM SCH MLS/HR: 3; .375 INJECTION, POWDER, FOR SOLUTION INTRAVENOUS at 02:49

## 2021-03-13 RX ADMIN — INSULIN ASPART SCH UNIT: 100 INJECTION, SOLUTION INTRAVENOUS; SUBCUTANEOUS at 02:49

## 2021-03-13 RX ADMIN — DEXTROSE SCH MG: 50 INJECTION, SOLUTION INTRAVENOUS at 09:51

## 2021-03-13 RX ADMIN — ALBUTEROL SULFATE SCH: 90 AEROSOL, METERED RESPIRATORY (INHALATION) at 15:59

## 2021-03-13 RX ADMIN — PANTOPRAZOLE SODIUM SCH MG: 40 INJECTION, POWDER, FOR SOLUTION INTRAVENOUS at 09:51

## 2021-03-13 RX ADMIN — ALBUTEROL SULFATE SCH PUFF: 90 AEROSOL, METERED RESPIRATORY (INHALATION) at 12:06

## 2021-03-13 RX ADMIN — CHLORHEXIDINE GLUCONATE SCH ML: 1.2 RINSE ORAL at 09:51

## 2021-03-13 RX ADMIN — PIPERACILLIN AND TAZOBACTAM SCH MLS/HR: 3; .375 INJECTION, POWDER, FOR SOLUTION INTRAVENOUS at 09:50

## 2021-03-13 RX ADMIN — MORPHINE SULFATE PRN MG: 4 INJECTION, SOLUTION INTRAMUSCULAR; INTRAVENOUS at 18:17

## 2021-03-13 RX ADMIN — CEFAZOLIN SCH MLS/HR: 330 INJECTION, POWDER, FOR SOLUTION INTRAMUSCULAR; INTRAVENOUS at 13:02

## 2021-03-13 RX ADMIN — INSULIN ASPART SCH UNIT: 100 INJECTION, SOLUTION INTRAVENOUS; SUBCUTANEOUS at 06:05

## 2021-03-13 RX ADMIN — INSULIN ASPART SCH UNIT: 100 INJECTION, SOLUTION INTRAVENOUS; SUBCUTANEOUS at 10:00

## 2021-03-13 RX ADMIN — OXYCODONE HYDROCHLORIDE AND ACETAMINOPHEN SCH MG: 500 TABLET ORAL at 09:51

## 2021-03-13 RX ADMIN — INSULIN ASPART SCH UNIT: 100 INJECTION, SOLUTION INTRAVENOUS; SUBCUTANEOUS at 02:48

## 2021-03-13 NOTE — P.PN
Subjective


Progress Note Date: 03/13/21








Treasure Wheeler, is a 81-year-old female patient of Dr. Falcon, who 

presented to [facility] emergency room with a chief complaint 

of worsening shortness of breath generalized weakness and confusion, symptoms 

started about 6 days prior to admission but her condition declined significantly

in the last 2 days.  Patient was evaluated in emergency room vital examination 

on presentation revealed a temperature of 98.4 pulse 133 respiration 30 blood 

pressure 135/67 pulse ox 55% on room air her white blood count was 3.8 

hemoglobin 15.9 platelet count 99 sodium 133 potassium 4.5 plasma lactic acid 

11.4 BUN 17 creatinine 0.9 arterial blood gases revealed a pH of 7.19 pCO2 49 by

mouth to 59 .  Chest x-ray done in the emergency room revealed moderate 

pulmonary edema that could relate to congestive heart failure or ARDS , EKG 

revealed sinus tachycardia with right bundle branch block , coronavirus PCR was 

positive.


patient was admitted to intensive care unit she was started on BiPAP.  Patient 

developed atrial fibrillation with rapid ventricular response with a heart rate 

of 170 she was started on Cardizem drip, she had episodes of hypotension, she 

was started on levophed drip, and earlier this morning she required intubation 

and mechanical ventilation.


Her past medical history is significant for history of hypertension, history of 

hyperlipidemia, history of insulin-dependent diabetes mellitus, and history of 

osteoporosis





On 03/07/2021 patient was seen and examined in the ICU she is intubated sedated 

maintained on mechanical ventilation she is on assist control rate of 30 FiO2 

60% and a PEEP of 15.  PH is 7.18 pCO2 46 by mouth 02/01/2008 lactic acid is 

down to 4.6 blood pressure 100/52 she is still maintained on levophed infusion, 

she is maintained on IV Decadron she received 1 unit of convalescent plasma, 

white blood count 24.6 hemoglobin 14.1 platelet count 181 sodium 136 potassium 

5.6 chloride 110 CO2 17 BUN 28 creatinine 1.2 glucose level 165





On 03/08/2021 patient was seen and examined in the ICU she is intubated sedated 

maintained on mechanical ventilation, today she was taken off of vasopressors 

and has been tolerating well blood pressure at this time 98/55, pH is 7.30 pCO2 

37 by mouth to 72 white blood count is down to 6.6 hemoglobin 12.6 platelet cou

nt 107 d-dimer is 1.77 BUN 34 creatinine 0.96





On 03/09/2021 patient was seen and examined in the ICU she remains intubated 

sedated maintained on mechanical ventilation FiO2 55% rate of 24 and PEEP of 15 

temperature is 97.5 pulse 79 respiration 24 blood pressure 127/58 pulse ox 91% 

on FiO2 55% mechanical ventilation d-dimer 6.8 to pH 7.26 pCO2 42 by mouth to 76

white blood count is 5.8 hemoglobin 12.6 platelet count 102 BUN 37 creatinine 

0.96 liver enzymes are elevated with AST at 71 a LT at 38





On 03/10/2021 patient was seen and examined in the ICU she is intubated sedated 

maintained on mechanical ventilation FiO2 55% rate of 24 and PEEP of 15 her 

vital examination reveals a temperature of 97.5 pulse 75 respiration 24 blood 

pressure 103/46 pulse ox 91% arterial blood gas reveals a pH of 7.27 pCO2 42 pO2

69


White blood count is 6.9 hemoglobin 12.8 platelet count 71 sodium 141 potassium 

4.7 chloride 120 CO2 20 BUN 33 creatinine 0.73 patient is not on any IV pressors

at this time.





On 03/11/2021 patient was seen and examined in the ICU she is intubated sedated 

maintained on mechanical ventilation, she is on FiO2 of 65% rate 24 PEEP of 15, 

temperature 97.6 pulse 51 respiration 24 blood pressure 131/43 pulse ox 94% ABG 

reveals a pH of 7.28 pCO2 44 PO2 60 white blood count is 7.2 hemoglobin 12.4 

platelet count 41 sodium 144 potassium 4.8 chloride 120 glucose level 242 at 

this time will change Accu-Chek and coverage to every 3 hours.





On 03/12/2021 patient was seen and examined in the ICU she is intubated sedated 

maintained on mechanical ventilation, case was discussed in details with Dr. Ruiz pulmonologist, patient is stable however there is no significant 

improvement for the last several days despite maximum medical therapy she is 

still requiring high oxygen requirement no significant improvement in chest x-

ray today her vital exam reveals a temperature of 97.9 pulse 59 respiration 24 

blood pressure 127/78 pulse ox 91% on mechanical ventilation FiO2 65% assist 

control rate of 24 and PEEP of 15 ABG reveals pH 7.31 pCO2 43 PO2 63





On 03/13/2021 patient was seen and examined in the ICU she is intubated sedated 

maintained on mechanical ventilation there is no improvement in her oxygen 

requirement she is still maintained on FiO2 65% assist control PEEP of 15 rate 

of 24 there is no improvement in chest x-ray.  Pulmonary critical care is 

discussing with family possibility of comfort care. 





Objective





- Vital Signs


Vital signs: 


                                   Vital Signs











Temp  98.0 F   03/13/21 04:00


 


Pulse  70   03/13/21 07:00


 


Resp  24   03/13/21 07:00


 


BP  111/57   03/13/21 07:00


 


Pulse Ox  92 L  03/13/21 07:00








                                 Intake & Output











 03/12/21 03/13/21 03/13/21





 18:59 06:59 18:59


 


Intake Total 2473.736 2190.493 850


 


Output Total 705 615 325


 


Balance 3744.693 5521.493 525


 


Weight 97 kg 102.9 kg 


 


Intake:   


 


  IV 1968 1596 680


 


    0.9 Sodium Chloride  1430 650


 


    Piperacillin-Tazobactam 3 200 100 





    .375 gm In Sodium   





    Chloride 0.9% 100 ml @ 25   





    mls/hr IVPB Q8H [facility] Rx#:   





    [id number]   


 


    Pressure Bag 78 66 30


 


    Sodium Chloride 0.9% 1, 1690  





    000 ml @ 130 mls/hr IV .   





    Q7H42M [facility] Rx#:[id number]   


 


  Intake, IV Titration 79.736 196.493 





  Amount   


 


    propofoL 1,000 mg In 79.736 196.493 





    Empty Bag 1 bag @ Titrate   





    IV .Q0M [facility] Rx#:   





    [id number]   


 


  Tube Feeding 336 308 140


 


  Other 90 90 30


 


Output:   


 


  Urine 705 615 325


 


Other:   


 


  Voiding Method Indwelling Catheter Indwelling Catheter Indwelling Catheter








                       ABP, PAP, CO, CI - Last Documented











Arterial Blood Pressure        140/47

















- Exam








Patient is intubated sedated maintained on mechanical ventilation


HEENT head normocephalic and atraumatic


Neck is supple no JVD no goiter no lymphadenopathy


Chest exam reveals a few scattered rhonchi no wheezing


Cardiac exam reveals regular heart sounds no gallops no murmurs


Abdomen is soft nontender no organomegaly with normal bowel sounds


Extremity exam reveals no edema no cyanosis or clubbing








- Labs


CBC & Chem 7: 


                                 03/12/21 04:45





                                 03/12/21 04:45


Labs: 


                  Abnormal Lab Results - Last 24 Hours (Table)











  03/12/21 03/12/21 03/12/21 Range/Units





  11:59 14:54 14:56 


 


POC Glucose (mg/dL)  209 H  233 H  214 H  (75-99)  mg/dL














  03/12/21 03/12/21 03/12/21 Range/Units





  17:26 20:29 23:40 


 


POC Glucose (mg/dL)  164 H  168 H  155 H  (75-99)  mg/dL














  03/13/21 03/13/21 03/13/21 Range/Units





  02:40 06:02 10:09 


 


POC Glucose (mg/dL)  169 H  151 H  179 H  (75-99)  mg/dL








                      Microbiology - Last 24 Hours (Table)











 03/06/21 16:43 Blood Culture - Final





 Blood    No Growth after 144 hours














Assessment and Plan


Plan: 





1. Acute hypoxic respiratory failure, requiring intubation and mechanical 

ventilation





2. Poitive Covid 19 testing





3. bilateral pulmonary infiltrates, likely related to pneumonia due to Covid 19 

virus





4.  Episode of atrial fibrillation with rapid ventricular response





5. Acute lactic acidosis improving





6. Episode of hypotension improved with IV fluid and levophed drip





7. Underlying history of hypertension





8. Underlying history of hyperlipidemia





9. Underlying history of diabetes mellitus





Currently patient is intubated sedated maintained on mechanical ventilation


She is maintained on IV Decadron and subcu Lovenox


Will follow closely prognosis is guarded due to age and severity of illness

## 2021-03-16 LAB — PH BLDA: 7.18 [PH] (ref 7.35–7.45)

## 2021-03-17 NOTE — P.DS
Providers


Date of admission: 


21 01:38





Expected date of discharge: 21


Attending physician: 


Robin Burgess





Consults: 





                                        





21 01:38


Consult Physician Urgent 


   Consulting Provider: Sara Minor


   Consult Reason/Comments: covid


   Do you want consulting provider notified?: Yes











Primary care physician: 


Anel Falcon





[facility] Course: 





Discharge diagnosis


 2021 acute hypoxic respiratory failure secondary to COVId- 19 

pneumonitis


1. Acute hypoxic respiratory failure, requiring intubation and mechanical 

ventilation





2. Poitive Covid 19 testing





3. bilateral pulmonary infiltrates, likely related to pneumonia due to Covid 19 

virus





4.  Episode of atrial fibrillation with rapid ventricular response





5. Acute lactic acidosis improving





6. Episode of hypotension improved with IV fluid and levophed drip





7. Underlying history of hypertension





8. Underlying history of hyperlipidemia





9. Underlying history of diabetes mellitus





Hospital course


Treasure Wheeler, is a 81-year-old female patient of Dr. Falcon, who 

presented to [facility] emergency room with a chief complaint 

of worsening shortness of breath generalized weakness and confusion, symptoms 

started about 6 days prior to admission but her condition declined significantly

in the last 2 days.  Patient was evaluated in emergency room vital examination 

on presentation revealed a temperature of 98.4 pulse 133 respiration 30 blood 

pressure 135/67 pulse ox 55% on room air her white blood count was 3.8 

hemoglobin 15.9 platelet count 99 sodium 133 potassium 4.5 plasma lactic acid 11

.4 BUN 17 creatinine 0.9 arterial blood gases revealed a pH of 7.19 pCO2 49 by 

mouth to 59 .  Chest x-ray done in the emergency room revealed moderate 

pulmonary edema that could relate to congestive heart failure or ARDS , EKG 

revealed sinus tachycardia with right bundle branch block , coronavirus PCR was 

positive.


patient was admitted to intensive care unit she was started on BiPAP.  Patient 

developed atrial fibrillation with rapid ventricular response with a heart rate 

of 170 she was started on Cardizem drip, she had episodes of hypotension, she 

was started on levophed drip, and earlier this morning she required intubation 

and mechanical ventilation.


Her past medical history is significant for history of hypertension, history of 

hyperlipidemia, history of insulin-dependent diabetes mellitus, and history of 

osteoporosis





On 2021 patient was seen and examined in the ICU she is intubated sedated 

maintained on mechanical ventilation she is on assist control rate of 30 FiO2 

60% and a PEEP of 15.  PH is 7.18 pCO2 46 by mouth 2008 lactic acid is 

down to 4.6 blood pressure 100/52 she is still maintained on levophed infusion, 

she is maintained on IV Decadron she received 1 unit of convalescent plasma, 

white blood count 24.6 hemoglobin 14.1 platelet count 181 sodium 136 potassium 

5.6 chloride 110 CO2 17 BUN 28 creatinine 1.2 glucose level 165





On 2021 patient was seen and examined in the ICU she is intubated sedated 

maintained on mechanical ventilation, today she was taken off of vasopressors 

and has been tolerating well blood pressure at this time 98/55, pH is 7.30 pCO2 

37 by mouth to 72 white blood count is down to 6.6 hemoglobin 12.6 platelet 

count 107 d-dimer is 1.77 BUN 34 creatinine 0.96





On 2021 patient was seen and examined in the ICU she remains intubated 

sedated maintained on mechanical ventilation FiO2 55% rate of 24 and PEEP of 15 

temperature is 97.5 pulse 79 respiration 24 blood pressure 127/58 pulse ox 91% 

on FiO2 55% mechanical ventilation d-dimer 6.8 to pH 7.26 pCO2 42 by mouth to 76

white blood count is 5.8 hemoglobin 12.6 platelet count 102 BUN 37 creatinine 

0.96 liver enzymes are elevated with AST at 71 a LT at 38





On 03/10/2021 patient was seen and examined in the ICU she is intubated sedated 

maintained on mechanical ventilation FiO2 55% rate of 24 and PEEP of 15 her 

vital examination reveals a temperature of 97.5 pulse 75 respiration 24 blood 

pressure 103/46 pulse ox 91% arterial blood gas reveals a pH of 7.27 pCO2 42 pO2

69


White blood count is 6.9 hemoglobin 12.8 platelet count 71 sodium 141 potassium 

4.7 chloride 120 CO2 20 BUN 33 creatinine 0.73 patient is not on any IV pressors

at this time.





On 2021 patient was seen and examined in the ICU she is intubated sedated 

maintained on mechanical ventilation, she is on FiO2 of 65% rate 24 PEEP of 15, 

temperature 97.6 pulse 51 respiration 24 blood pressure 131/43 pulse ox 94% ABG 

reveals a pH of 7.28 pCO2 44 PO2 60 white blood count is 7.2 hemoglobin 12.4 p

latelet count 41 sodium 144 potassium 4.8 chloride 120 glucose level 242 at this

time will change Accu-Chek and coverage to every 3 hours.





On 2021 patient was seen and examined in the ICU she is intubated sedated 

maintained on mechanical ventilation, case was discussed in details with Dr. Ruiz pulmonologist, patient is stable however there is no significant 

improvement for the last several days despite maximum medical therapy she is 

still requiring high oxygen requirement no significant improvement in chest x-

ray today her vital exam reveals a temperature of 97.9 pulse 59 respiration 24 

blood pressure 127/78 pulse ox 91% on mechanical ventilation FiO2 65% assist 

control rate of 24 and PEEP of 15 ABG reveals pH 7.31 pCO2 43 PO2 63





On 2021 patient was seen and examined in the ICU she is intubated sedated 

maintained on mechanical ventilation there is no improvement in her oxygen 

requirement she is still maintained on FiO2 65% assist control PEEP of 15 rate 

of 24 there is no improvement in chest x-ray.  Pulmonary critical care is 

discussing with family possibility of comfort care. 





Family decided to move forward with comfort care measures








Plan - Discharge Summary


Discharge Rx Participant: Yes


New Discharge Prescriptions: 


No Action


   Cholecalciferol [Vitamin D3] 1,000 unit PO DAILY


   metFORMIN HCL 1,500 mg PO W/BRKFST


   Simvastatin [Zocor] 10 mg PO HS


   Omeprazole 20 mg PO DAILY


   Losartan [Cozaar] 25 mg PO QAM


   Insulin Glargine,Hum.rec.anlog [Lantus Solostar] 75 unit SQ HS


   Dapagliflozin Propanediol [Farxiga] 10 mg PO HS


   Aspirin [Adult Low Dose Aspirin EC] 81 mg PO DAILY


   Acetaminophen Tab [Tylenol Tab] 500 mg PO Q6H PRN


     PRN Reason: Pain


   Sulfamethox-Tmp 800-160Mg [Bactrim -160 mg] 1 tab PO Q12HR


   Furosemide [Lasix] 40 mg PO DAILY PRN


     PRN Reason: Edema


   metFORMIN HCL 1,000 mg PO W/SUPPER


Discharge Medication List





Aspirin [Adult Low Dose Aspirin EC] 81 mg PO DAILY 17 [History]


Cholecalciferol [Vitamin D3] 1,000 unit PO DAILY 17 [History]


Dapagliflozin Propanediol [Farxiga] 10 mg PO HS 17 [History]


Insulin Glargine,Hum.rec.anlog [Lantus Solostar] 75 unit SQ HS 17 

[History]


Losartan [Cozaar] 25 mg PO QAM 17 [History]


Omeprazole 20 mg PO DAILY 17 [History]


Simvastatin [Zocor] 10 mg PO HS 17 [History]


metFORMIN HCL 1,500 mg PO W/BRKFST 17 [History]


Acetaminophen Tab [Tylenol Tab] 500 mg PO Q6H PRN 21 [History]


Furosemide [Lasix] 40 mg PO DAILY PRN 21 [History]


Sulfamethox-Tmp 800-160Mg [Bactrim -160 mg] 1 tab PO Q12HR 21 

[History]


metFORMIN HCL 1,000 mg PO W/SUPPER 21 [History]








Follow up Appointment(s)/Referral(s): 


Anel Falcon MD [Primary Care Provider] - 1-2 days


Discharge Disposition: 





- Preliminary Cause of Death


Preliminary Cause of Death: Acute hypoxic respiratory failure secondary to COVID

19 pneumonitis

## 2022-07-25 NOTE — P.PCN
Date of Procedure: 09/27/17


Procedure(s) Performed: 


BRIEF HISTORY: Patient is a 77-year-old pleasant white female scheduled for an 

elective colonoscopy as a part of evaluation of prior history of colon polyps.





PROCEDURE PERFORMED: Colonoscopy with snare polypectomy 





PREOPERATIVE DIAGNOSIS: History of colon polyps. 





IV sedation per Anesthesia. 





PROCEDURE: After informed consent was obtained, the patient, was brought into 

the endoscopy unit. IV sedation was administered by Anesthesia under continuous 

monitoring.  Digital rectal examination was normal. Initially the Olympus CF-

160 flexible video colonoscope was then inserted in the rectum, gradually 

advanced into the cecum without any difficulty. Careful examination was 

performed as the scope was gradually being withdrawn. Ileocecal valve and the 

appendiceal orifice were visualized and appeared normal.  Prep was excellent. 

Mucosa of the cecum, ascending colon, transverse colon appeared normal.  In the 

descending colon there was once limited polyp removed by snare polypectomy and 

in the sigmoid colon there was another 1 with a broad-based polyp removed by 

snare polypectomy.  The last of the descending colon, sigmoid colon, and rectum 

appeared normal. moderate left sided diverticulosis seen. Retroflexion was 

performed in the rectum and no lesions were seen. The patient tolerated the 

procedure well. 





IMPRESSION: 


1 cm descending colon polyp status post polypectomy


1 cm sigmoid colon polyp serous post-polypectomy


Moderate left-sided diverticulosis.





RECOMMENDATIONS:  Findings of this examination were discussed with the patient 

as well as a family.  He was advised to follow with the biopsy results.  If the 

biopsy shows tubular adenoma she can have a repeat colonoscopy in 5 years.
no